# Patient Record
Sex: FEMALE | Race: WHITE | NOT HISPANIC OR LATINO | Employment: FULL TIME | URBAN - METROPOLITAN AREA
[De-identification: names, ages, dates, MRNs, and addresses within clinical notes are randomized per-mention and may not be internally consistent; named-entity substitution may affect disease eponyms.]

---

## 2017-09-26 ENCOUNTER — OFFICE VISIT (OUTPATIENT)
Dept: FAMILY MEDICINE | Facility: CLINIC | Age: 32
End: 2017-09-26
Payer: COMMERCIAL

## 2017-09-26 VITALS
HEIGHT: 64 IN | BODY MASS INDEX: 36.77 KG/M2 | DIASTOLIC BLOOD PRESSURE: 80 MMHG | TEMPERATURE: 99 F | HEART RATE: 71 BPM | WEIGHT: 215.38 LBS | SYSTOLIC BLOOD PRESSURE: 120 MMHG | RESPIRATION RATE: 16 BRPM | OXYGEN SATURATION: 98 %

## 2017-09-26 DIAGNOSIS — J06.9 UPPER RESPIRATORY TRACT INFECTION, UNSPECIFIED TYPE: Primary | ICD-10-CM

## 2017-09-26 PROCEDURE — 3008F BODY MASS INDEX DOCD: CPT | Mod: S$GLB,,, | Performed by: PHYSICIAN ASSISTANT

## 2017-09-26 PROCEDURE — 99999 PR PBB SHADOW E&M-EST. PATIENT-LVL III: CPT | Mod: PBBFAC,,, | Performed by: PHYSICIAN ASSISTANT

## 2017-09-26 PROCEDURE — 99213 OFFICE O/P EST LOW 20 MIN: CPT | Mod: S$GLB,,, | Performed by: PHYSICIAN ASSISTANT

## 2017-09-26 RX ORDER — PROMETHAZINE HYDROCHLORIDE AND DEXTROMETHORPHAN HYDROBROMIDE 6.25; 15 MG/5ML; MG/5ML
5 SYRUP ORAL 4 TIMES DAILY PRN
Qty: 120 ML | Refills: 0 | Status: SHIPPED | OUTPATIENT
Start: 2017-09-26 | End: 2017-10-03

## 2017-09-26 RX ORDER — BENZONATATE 100 MG/1
100 CAPSULE ORAL 3 TIMES DAILY PRN
Qty: 30 CAPSULE | Refills: 0 | Status: SHIPPED | OUTPATIENT
Start: 2017-09-26 | End: 2017-10-26

## 2017-09-26 RX ORDER — LEVOCETIRIZINE DIHYDROCHLORIDE 5 MG/1
5 TABLET, FILM COATED ORAL NIGHTLY
Qty: 30 TABLET | Refills: 11 | Status: SHIPPED | OUTPATIENT
Start: 2017-09-26 | End: 2021-02-17

## 2017-09-26 RX ORDER — ALBUTEROL SULFATE 90 UG/1
2 AEROSOL, METERED RESPIRATORY (INHALATION) EVERY 6 HOURS PRN
Qty: 1 EACH | Refills: 11 | Status: SHIPPED | OUTPATIENT
Start: 2017-09-26

## 2017-09-26 NOTE — LETTER
September 26, 2017         [facility]  Family Medicine  [address]  [address]  [place] [address]  Phone: [phone]  Fax: [phone]   September 26, 2017     Patient: Maggie Obregon   YOB: 1985   Date of Visit: 9/26/2017       To Whom it May Concern:    Maggie Obregon was seen in my clinic on 9/26/2017. She may return to work on when is without fever for 24 hours without any medications.    If you have any questions or concerns, please don't hesitate to call.    Sincerely,         MOSHE Meyrs

## 2017-09-26 NOTE — PROGRESS NOTES
Subjective:       Patient ID: Maggie Obregon is a 32 y.o. female with multiple medical diagnoses as listed in the medical history and problem list that presents for URI and Fever (102.5 last night)  .    Chief Complaint: URI and Fever (102.5 last night)      URI    This is a new problem. The current episode started yesterday. The maximum temperature recorded prior to her arrival was 102 - 102.9 F. Associated symptoms include congestion, coughing (dry and productive ), ear pain, headaches, a sore throat and vomiting (with cough ). Pertinent negatives include no abdominal pain, diarrhea, nausea, rhinorrhea, sinus pain, sneezing or wheezing. Treatments tried: mucinex, tylenol cold and sinus and emergency  The treatment provided mild relief.   Fever    This is a new problem. The current episode started yesterday. The maximum temperature noted was 102 to 102.9 F. Associated symptoms include congestion, coughing (dry and productive ), ear pain, headaches, a sore throat and vomiting (with cough ). Pertinent negatives include no abdominal pain, diarrhea, nausea or wheezing.     Review of Systems   Constitutional: Positive for chills, fatigue and fever.   HENT: Positive for congestion, ear pain, postnasal drip, sinus pressure, sore throat and trouble swallowing. Negative for ear discharge, rhinorrhea, sinus pain and sneezing.    Eyes: Negative for pain, discharge, redness and itching.   Respiratory: Positive for cough (dry and productive ). Negative for chest tightness, shortness of breath and wheezing.         Smoker    Gastrointestinal: Positive for vomiting (with cough ). Negative for abdominal pain, diarrhea and nausea.   Musculoskeletal: Positive for myalgias.   Neurological: Positive for headaches.         PAST MEDICAL HISTORY:  History reviewed. No pertinent past medical history.    SOCIAL HISTORY:  Social History     Social History    Marital status:      Spouse name: N/A    Number of children: N/A    Years of  "education: N/A     Occupational History    Not on file.     Social History Main Topics    Smoking status: Current Every Day Smoker     Packs/day: 1.00     Types: Cigarettes    Smokeless tobacco: Never Used    Alcohol use No    Drug use: No    Sexual activity: Yes     Partners: Male     Other Topics Concern    Not on file     Social History Narrative    No narrative on file       ALLERGIES AND MEDICATIONS: updated and reviewed.  Review of patient's allergies indicates:  No Known Allergies  Current Outpatient Prescriptions   Medication Sig Dispense Refill    albuterol 90 mcg/actuation inhaler Inhale 2 puffs into the lungs every 6 (six) hours as needed for Wheezing. 1 each 11    benzonatate (TESSALON) 100 MG capsule Take 1 capsule (100 mg total) by mouth 3 (three) times daily as needed for Cough. 30 capsule 0    levocetirizine (XYZAL) 5 MG tablet Take 1 tablet (5 mg total) by mouth every evening. 30 tablet 11    promethazine-dextromethorphan (PROMETHAZINE-DM) 6.25-15 mg/5 mL Syrp Take 5 mLs by mouth 4 (four) times daily as needed. 120 mL 0     Current Facility-Administered Medications   Medication Dose Route Frequency Provider Last Rate Last Dose    levonorgestrel 20 mcg/24 hr (5 years) IUD 1 each  1 each Intrauterine 1 time in Clinic/HOD Tk Choudhury MD             Objective:   /80   Pulse 71   Temp 98.9 °F (37.2 °C) (Oral)   Resp 16   Ht 5' 4" (1.626 m)   Wt 97.7 kg (215 lb 6.2 oz)   LMP 09/19/2017 (Exact Date)   SpO2 98%   BMI 36.97 kg/m²      Physical Exam   Constitutional: She is oriented to person, place, and time. No distress.   HENT:   Head: Normocephalic and atraumatic.   Right Ear: External ear and ear canal normal. Tympanic membrane is injected.   Left Ear: External ear and ear canal normal. Tympanic membrane is injected.   Nose: Nose normal. Right sinus exhibits no maxillary sinus tenderness and no frontal sinus tenderness. Left sinus exhibits no maxillary sinus tenderness and " no frontal sinus tenderness.   Mouth/Throat: Uvula is midline and mucous membranes are normal. No oropharyngeal exudate or posterior oropharyngeal erythema.   Air fluid levels   PND   Eyes: Conjunctivae and EOM are normal.   Cardiovascular: Normal rate and regular rhythm.    Pulmonary/Chest: Effort normal and breath sounds normal. She has no decreased breath sounds. She has no wheezes. She has no rhonchi. She has no rales.   Negative egophony  Normal tactile fremitus  No dullness on percussion   Lymphadenopathy:     She has no cervical adenopathy.   Neurological: She is oriented to person, place, and time.   Skin: Skin is warm. No erythema.           Assessment:       1. Upper respiratory tract infection, unspecified type        Plan:       Upper respiratory tract infection, unspecified type  -     levocetirizine (XYZAL) 5 MG tablet; Take 1 tablet (5 mg total) by mouth every evening.  Dispense: 30 tablet; Refill: 11  -     albuterol 90 mcg/actuation inhaler; Inhale 2 puffs into the lungs every 6 (six) hours as needed for Wheezing.  Dispense: 1 each; Refill: 11  -     promethazine-dextromethorphan (PROMETHAZINE-DM) 6.25-15 mg/5 mL Syrp; Take 5 mLs by mouth 4 (four) times daily as needed.  Dispense: 120 mL; Refill: 0  -     benzonatate (TESSALON) 100 MG capsule; Take 1 capsule (100 mg total) by mouth 3 (three) times daily as needed for Cough.  Dispense: 30 capsule; Refill: 0    call if better than worse or no improvement at all in a week.  Increase fluids and rest.   Ibuprofen for aches.         No Follow-up on file.

## 2018-04-04 ENCOUNTER — OFFICE VISIT (OUTPATIENT)
Dept: FAMILY MEDICINE | Facility: CLINIC | Age: 33
End: 2018-04-04
Payer: COMMERCIAL

## 2018-04-04 VITALS
HEIGHT: 64 IN | TEMPERATURE: 98 F | RESPIRATION RATE: 16 BRPM | OXYGEN SATURATION: 98 % | HEART RATE: 72 BPM | WEIGHT: 211.63 LBS | SYSTOLIC BLOOD PRESSURE: 110 MMHG | DIASTOLIC BLOOD PRESSURE: 80 MMHG | BODY MASS INDEX: 36.13 KG/M2

## 2018-04-04 DIAGNOSIS — J30.9 ACUTE ALLERGIC RHINITIS, UNSPECIFIED SEASONALITY, UNSPECIFIED TRIGGER: ICD-10-CM

## 2018-04-04 DIAGNOSIS — R31.9 URINARY TRACT INFECTION WITH HEMATURIA, SITE UNSPECIFIED: Primary | ICD-10-CM

## 2018-04-04 DIAGNOSIS — Z23 NEED FOR 23-POLYVALENT PNEUMOCOCCAL POLYSACCHARIDE VACCINE: ICD-10-CM

## 2018-04-04 DIAGNOSIS — N39.0 URINARY TRACT INFECTION WITH HEMATURIA, SITE UNSPECIFIED: Primary | ICD-10-CM

## 2018-04-04 LAB
BILIRUB SERPL-MCNC: NEGATIVE MG/DL
BLOOD URINE, POC: 50
COLOR, POC UA: YELLOW
GLUCOSE UR QL STRIP: NEGATIVE
KETONES UR QL STRIP: NEGATIVE
LEUKOCYTE ESTERASE URINE, POC: ABNORMAL
NITRITE, POC UA: POSITIVE
PH, POC UA: 5
PROTEIN, POC: ABNORMAL
SPECIFIC GRAVITY, POC UA: 1.02
UROBILINOGEN, POC UA: NEGATIVE

## 2018-04-04 PROCEDURE — 81000 URINALYSIS NONAUTO W/SCOPE: CPT

## 2018-04-04 PROCEDURE — 99999 PR PBB SHADOW E&M-EST. PATIENT-LVL IV: CPT | Mod: PBBFAC,,, | Performed by: PHYSICIAN ASSISTANT

## 2018-04-04 PROCEDURE — 90732 PPSV23 VACC 2 YRS+ SUBQ/IM: CPT | Mod: S$GLB,,, | Performed by: FAMILY MEDICINE

## 2018-04-04 PROCEDURE — 81001 URINALYSIS AUTO W/SCOPE: CPT | Mod: S$GLB,,, | Performed by: PHYSICIAN ASSISTANT

## 2018-04-04 PROCEDURE — 90471 IMMUNIZATION ADMIN: CPT | Mod: S$GLB,,, | Performed by: FAMILY MEDICINE

## 2018-04-04 PROCEDURE — 99214 OFFICE O/P EST MOD 30 MIN: CPT | Mod: 25,S$GLB,, | Performed by: PHYSICIAN ASSISTANT

## 2018-04-04 PROCEDURE — 87086 URINE CULTURE/COLONY COUNT: CPT

## 2018-04-04 RX ORDER — AZELASTINE 1 MG/ML
1 SPRAY, METERED NASAL 2 TIMES DAILY
Qty: 30 ML | Refills: 11 | Status: SHIPPED | OUTPATIENT
Start: 2018-04-04 | End: 2021-02-12

## 2018-04-04 RX ORDER — CEPHALEXIN 500 MG/1
500 CAPSULE ORAL 2 TIMES DAILY
Qty: 6 CAPSULE | Refills: 0 | Status: SHIPPED | OUTPATIENT
Start: 2018-04-04 | End: 2018-04-07

## 2018-04-04 NOTE — PROGRESS NOTES
Administered Pneumococcal 23 vaccine IM to right deltoid.  Patient tolerated injection well, no adverse reactions noted.

## 2018-04-04 NOTE — PROGRESS NOTES
Subjective:       Patient ID: Maggie Obregon is a 32 y.o. female with multiple medical diagnoses as listed in the medical history and problem list that presents for URI (3 days) and Urinary Tract Infection (1 week)  .    Chief Complaint: URI (3 days) and Urinary Tract Infection (1 week)      URI    This is a new problem. The current episode started in the past 7 days (3 days ). The problem has been gradually worsening. Associated symptoms include congestion, coughing (dry; worse at night lying down ) and a sore throat (scratchy ). Pertinent negatives include no abdominal pain, diarrhea, ear pain, headaches, nausea, rhinorrhea, sinus pain, sneezing, vomiting (once ) or wheezing. Treatments tried: promethazine DM; 4 days    Urinary Tract Infection    This is a new problem. The current episode started in the past 7 days. The problem occurs every urination. The problem has been gradually worsening. The quality of the pain is described as burning (started yesterday ). There has been no fever. She is sexually active. There is a history of pyelonephritis. Associated symptoms include frequency and urgency. Pertinent negatives include no chills, discharge, flank pain, hematuria, hesitancy, nausea, vomiting (once ) or bubble bath use. She has tried nothing for the symptoms. Her past medical history is significant for recurrent UTIs.     Review of Systems   Constitutional: Negative for chills, fatigue and fever.   HENT: Positive for congestion, postnasal drip, sore throat (scratchy ) and voice change. Negative for ear pain, rhinorrhea, sinus pain, sinus pressure and sneezing.    Eyes: Negative for pain, discharge, redness and itching.   Respiratory: Positive for cough (dry; worse at night lying down ). Negative for chest tightness, shortness of breath and wheezing.    Gastrointestinal: Negative for abdominal pain, diarrhea, nausea and vomiting (once ).   Genitourinary: Positive for frequency and urgency. Negative for flank pain,  "hematuria and hesitancy.   Neurological: Negative for headaches.         PAST MEDICAL HISTORY:  History reviewed. No pertinent past medical history.    SOCIAL HISTORY:  Social History     Social History    Marital status:      Spouse name: N/A    Number of children: N/A    Years of education: N/A     Occupational History    Not on file.     Social History Main Topics    Smoking status: Current Every Day Smoker     Packs/day: 1.00     Types: Cigarettes    Smokeless tobacco: Never Used    Alcohol use No    Drug use: No    Sexual activity: Yes     Partners: Male     Other Topics Concern    Not on file     Social History Narrative    No narrative on file       ALLERGIES AND MEDICATIONS: updated and reviewed.  Review of patient's allergies indicates:  No Known Allergies  Current Outpatient Prescriptions   Medication Sig Dispense Refill    albuterol 90 mcg/actuation inhaler Inhale 2 puffs into the lungs every 6 (six) hours as needed for Wheezing. 1 each 11    levocetirizine (XYZAL) 5 MG tablet Take 1 tablet (5 mg total) by mouth every evening. 30 tablet 11    azelastine (ASTELIN) 137 mcg (0.1 %) nasal spray 1 spray (137 mcg total) by Nasal route 2 (two) times daily. 30 mL 11    cephALEXin (KEFLEX) 500 MG capsule Take 1 capsule (500 mg total) by mouth 2 (two) times daily. 6 capsule 0     Current Facility-Administered Medications   Medication Dose Route Frequency Provider Last Rate Last Dose    levonorgestrel 20 mcg/24 hr (5 years) IUD 1 each  1 each Intrauterine 1 time in Clinic/HOD Tk Choudhury MD             Objective:   /80   Pulse 72   Temp 98.3 °F (36.8 °C) (Oral)   Resp 16   Ht 5' 4" (1.626 m)   Wt 96 kg (211 lb 10.3 oz)   LMP 03/28/2018   SpO2 98%   BMI 36.33 kg/m²      Physical Exam   Constitutional: No distress.   HENT:   Head: Normocephalic and atraumatic.   Right Ear: External ear and ear canal normal. Tympanic membrane is not injected. No middle ear effusion.   Left Ear: " External ear and ear canal normal. Tympanic membrane is not injected.  No middle ear effusion.   Nose: No mucosal edema or rhinorrhea. Right sinus exhibits no maxillary sinus tenderness and no frontal sinus tenderness. Left sinus exhibits no maxillary sinus tenderness and no frontal sinus tenderness.   Mouth/Throat: Uvula is midline and mucous membranes are normal. No oropharyngeal exudate or posterior oropharyngeal erythema.   Air fluid levels   PND   Eyes: Conjunctivae and EOM are normal.   Cardiovascular: Normal rate and regular rhythm.    Pulmonary/Chest: Effort normal and breath sounds normal.   Abdominal: Bowel sounds are normal. There is no tenderness.   Lymphadenopathy:     She has no cervical adenopathy.   Skin: Skin is warm.           Assessment:       1. Urinary tract infection with hematuria, site unspecified    2. Acute allergic rhinitis, unspecified seasonality, unspecified trigger    3. Need for 23-polyvalent pneumococcal polysaccharide vaccine        Plan:       Urinary tract infection with hematuria, site unspecified  -     cephALEXin (KEFLEX) 500 MG capsule; Take 1 capsule (500 mg total) by mouth 2 (two) times daily.  Dispense: 6 capsule; Refill: 0  -     POCT urinalysis, dipstick or tablet reag: +leuko, pos nitrate, and 50 blood   -     Urinalysis; Future; Expected date: 04/04/2018  -     Urine culture; Future; Expected date: 04/04/2018    -will contact with results     Acute allergic rhinitis, unspecified seasonality, unspecified trigger  -     azelastine (ASTELIN) 137 mcg (0.1 %) nasal spray; 1 spray (137 mcg total) by Nasal route 2 (two) times daily.  Dispense: 30 mL; Refill: 11  Continue xyzal.    Need for 23-polyvalent pneumococcal polysaccharide vaccine  -     (In Office Administered) Pneumococcal Polysaccharide Vaccine (23 Valent) (SQ/IM)    Will schedule with Dr. Chand for pap.         No Follow-up on file.

## 2018-04-05 LAB
AMORPH CRY URNS QL MICRO: ABNORMAL
BACTERIA #/AREA URNS HPF: ABNORMAL /HPF
BILIRUB UR QL STRIP: NEGATIVE
CLARITY UR: ABNORMAL
COLOR UR: ABNORMAL
GLUCOSE UR QL STRIP: NEGATIVE
HGB UR QL STRIP: ABNORMAL
KETONES UR QL STRIP: NEGATIVE
LEUKOCYTE ESTERASE UR QL STRIP: ABNORMAL
MICROSCOPIC COMMENT: ABNORMAL
NITRITE UR QL STRIP: NEGATIVE
PH UR STRIP: 5 [PH] (ref 5–8)
PROT UR QL STRIP: NEGATIVE
RBC #/AREA URNS HPF: 2 /HPF (ref 0–4)
SP GR UR STRIP: 1.02 (ref 1–1.03)
SQUAMOUS #/AREA URNS HPF: ABNORMAL /HPF
URN SPEC COLLECT METH UR: ABNORMAL
UROBILINOGEN UR STRIP-ACNC: NEGATIVE EU/DL
WBC #/AREA URNS HPF: 10 /HPF (ref 0–5)

## 2018-04-07 LAB — BACTERIA UR CULT: NORMAL

## 2018-04-17 ENCOUNTER — OFFICE VISIT (OUTPATIENT)
Dept: OBSTETRICS AND GYNECOLOGY | Facility: CLINIC | Age: 33
End: 2018-04-17
Payer: COMMERCIAL

## 2018-04-17 VITALS
BODY MASS INDEX: 35.68 KG/M2 | DIASTOLIC BLOOD PRESSURE: 78 MMHG | HEIGHT: 64 IN | WEIGHT: 209 LBS | SYSTOLIC BLOOD PRESSURE: 133 MMHG

## 2018-04-17 DIAGNOSIS — Z30.431 IUD CHECK UP: ICD-10-CM

## 2018-04-17 DIAGNOSIS — N92.1 METRORRHAGIA: ICD-10-CM

## 2018-04-17 DIAGNOSIS — Z01.419 GYNECOLOGIC EXAM NORMAL: Primary | ICD-10-CM

## 2018-04-17 DIAGNOSIS — Z11.3 SCREEN FOR STD (SEXUALLY TRANSMITTED DISEASE): ICD-10-CM

## 2018-04-17 PROCEDURE — 99395 PREV VISIT EST AGE 18-39: CPT | Mod: S$GLB,,, | Performed by: OBSTETRICS & GYNECOLOGY

## 2018-04-17 PROCEDURE — 87491 CHLMYD TRACH DNA AMP PROBE: CPT

## 2018-04-17 PROCEDURE — 88175 CYTOPATH C/V AUTO FLUID REDO: CPT

## 2018-04-17 PROCEDURE — 99999 PR PBB SHADOW E&M-EST. PATIENT-LVL III: CPT | Mod: PBBFAC,,, | Performed by: OBSTETRICS & GYNECOLOGY

## 2018-04-17 RX ORDER — ESTRADIOL 2 MG/1
2 TABLET ORAL DAILY
Qty: 21 TABLET | Refills: 0 | Status: SHIPPED | OUTPATIENT
Start: 2018-04-17 | End: 2019-12-06

## 2018-04-17 NOTE — PROGRESS NOTES
Subjective:       Patient ID: Maggie Obergon is a 33 y.o. female.    Chief Complaint:  Gynecologic Exam      History of Present Illness  HPI  Annual Exam-Premenopausal  Patient presents for annual exam. The patient has no complaints today. The patient is sexually active. GYN screening history: last pap: approximate date approx 3 yrs ago per pt hx and was normal. The patient wears seatbelts: yes. The patient participates in regular exercise: no. Has the patient ever been transfused or tattooed?: yes. The patient reports that there is not domestic violence in her life.    Pt has Mirena IUD placed 2015 for contraception and pt is happy with device.  Pt usually has monthly menses in spite of device, but pt has been bleeding for 19 days.  There is no pain.                  GYN & OB History  Patient's last menstrual period was 2018.   Date of Last Pap: No result found    OB History    Para Term  AB Living   4         4   SAB TAB Ectopic Multiple Live Births                  # Outcome Date GA Lbr Pastor/2nd Weight Sex Delivery Anes PTL Lv   4             3             2             1                    Review of Systems  Review of Systems   Constitutional: Negative.    Respiratory: Negative.    Cardiovascular: Negative.    Gastrointestinal: Negative.    Endocrine: Negative.    Genitourinary: Negative.    Musculoskeletal: Negative.    Hematological: Negative.    Psychiatric/Behavioral: Negative.    Breast: negative.            Objective:    Physical Exam:   Constitutional: She is oriented to person, place, and time. She appears well-developed and well-nourished. No distress.    HENT:   Head: Normocephalic and atraumatic.     Neck: Normal range of motion.    Cardiovascular: Normal rate.     Pulmonary/Chest: Effort normal. No respiratory distress. Right breast exhibits no inverted nipple, no mass, no nipple discharge, no skin change, no tenderness, presence, no bleeding and no  swelling. Left breast exhibits no inverted nipple, no mass, no nipple discharge, no skin change, no tenderness, presence, no bleeding and no swelling.        Abdominal: Soft. She exhibits no distension and no mass. There is no tenderness. There is no rebound and no guarding.     Genitourinary: Vagina normal and uterus normal. There is no rash, tenderness, lesion or injury on the right labia. There is no rash, tenderness, lesion or injury on the left labia. Right adnexum displays no mass, no tenderness and no fullness. Left adnexum displays no mass, no tenderness and no fullness. No erythema, tenderness, bleeding, rectocele, cystocele or unspecified prolapse of vaginal walls in the vagina. No foreign body in the vagina. No signs of injury around the vagina. No vaginal discharge found. Labial bartholins normal.Cervix exhibits no motion tenderness, no discharge, no friability and presence. Additional cervical findings: IUD strings visualizedAlso,  no recent pap smear            Musculoskeletal: Normal range of motion and moves all extremeties. She exhibits no tenderness.       Neurological: She is alert and oriented to person, place, and time. No cranial nerve deficit. Coordination normal.    Skin: She is not diaphoretic.    Psychiatric: She has a normal mood and affect. Her behavior is normal. Judgment and thought content normal.          Assessment:        1. Gynecologic exam normal    2. Screen for STD (sexually transmitted disease)    3. Metrorrhagia    4. IUD check up               Plan:      1.  Rx estradiol 2 mg x 21 d for bleeding with IUD  2.  Pap, GC/CT collected

## 2018-04-18 LAB
C TRACH DNA SPEC QL NAA+PROBE: NOT DETECTED
N GONORRHOEA DNA SPEC QL NAA+PROBE: NOT DETECTED

## 2019-01-07 ENCOUNTER — OFFICE VISIT (OUTPATIENT)
Dept: FAMILY MEDICINE | Facility: CLINIC | Age: 34
End: 2019-01-07
Payer: COMMERCIAL

## 2019-01-07 VITALS
RESPIRATION RATE: 18 BRPM | BODY MASS INDEX: 39.78 KG/M2 | OXYGEN SATURATION: 97 % | TEMPERATURE: 98 F | DIASTOLIC BLOOD PRESSURE: 80 MMHG | WEIGHT: 233 LBS | HEART RATE: 65 BPM | HEIGHT: 64 IN | SYSTOLIC BLOOD PRESSURE: 118 MMHG

## 2019-01-07 DIAGNOSIS — E66.01 SEVERE OBESITY (BMI >= 40): Primary | ICD-10-CM

## 2019-01-07 DIAGNOSIS — Z00.00 ANNUAL PHYSICAL EXAM: ICD-10-CM

## 2019-01-07 PROCEDURE — 99999 PR PBB SHADOW E&M-EST. PATIENT-LVL III: CPT | Mod: PBBFAC,,, | Performed by: PHYSICIAN ASSISTANT

## 2019-01-07 PROCEDURE — 99395 PR PREVENTIVE VISIT,EST,18-39: ICD-10-PCS | Mod: S$GLB,,, | Performed by: PHYSICIAN ASSISTANT

## 2019-01-07 PROCEDURE — 99395 PREV VISIT EST AGE 18-39: CPT | Mod: S$GLB,,, | Performed by: PHYSICIAN ASSISTANT

## 2019-01-07 PROCEDURE — 99999 PR PBB SHADOW E&M-EST. PATIENT-LVL III: ICD-10-PCS | Mod: PBBFAC,,, | Performed by: PHYSICIAN ASSISTANT

## 2019-01-07 NOTE — PROGRESS NOTES
Subjective:       Patient ID: Maggie Obregon is a 33 y.o. female with multiple medical diagnoses as listed in the medical history and problem list that presents for Annual Exam and Sinus Problem  .    Chief Complaint: Annual Exam and Sinus Problem      HPI   Concerns: weight gain, interested in weight loss medications and a cough  Diet: eat at home; fried foods, red meats, carbs   Exercise: just what she does at work:  at [employer]   Eye exam: 18  Dentist: twice a year      LMP: 18; regular-it has gone longer before for which Dr. Choudhury gave her estrace but she has not had to take that  Last pap: 2018      Review of Systems   Constitutional: Negative for activity change, appetite change, chills, fatigue and fever.   HENT: Negative for congestion, rhinorrhea, sinus pressure and sinus pain.    Eyes: Negative for photophobia, pain, discharge and itching.   Respiratory: Positive for cough, chest tightness and shortness of breath. Negative for wheezing.         No history of asthma  Smoker 1 pack per day   Interested in quitting    Cardiovascular: Negative for chest pain, palpitations and leg swelling.   Gastrointestinal: Negative for abdominal pain, constipation, diarrhea, nausea and rectal pain.   Endocrine: Positive for polydipsia. Negative for cold intolerance, heat intolerance and polyuria.   Genitourinary: Negative for dysuria and frequency.   Musculoskeletal: Negative for arthralgias, joint swelling, myalgias, neck pain and neck stiffness.   Skin: Negative for rash.   Neurological: Positive for headaches (temple ). Negative for dizziness and light-headedness.   Psychiatric/Behavioral: Negative for sleep disturbance.         PAST MEDICAL HISTORY:  No past medical history on file.    SOCIAL HISTORY:  Social History     Socioeconomic History    Marital status:      Spouse name: Not on file    Number of children: Not on file    Years of education: Not on file    Highest education  "level: Not on file   Social Needs    Financial resource strain: Not on file    Food insecurity - worry: Not on file    Food insecurity - inability: Not on file    Transportation needs - medical: Not on file    Transportation needs - non-medical: Not on file   Occupational History    Not on file   Tobacco Use    Smoking status: Current Every Day Smoker     Packs/day: 1.00     Types: Cigarettes    Smokeless tobacco: Never Used   Substance and Sexual Activity    Alcohol use: No     Alcohol/week: 0.0 oz    Drug use: No    Sexual activity: Yes     Partners: Male     Birth control/protection: IUD     Comment: mirena   Other Topics Concern    Not on file   Social History Narrative    Not on file       ALLERGIES AND MEDICATIONS: updated and reviewed.  Review of patient's allergies indicates:  No Known Allergies  Current Outpatient Medications   Medication Sig Dispense Refill    azelastine (ASTELIN) 137 mcg (0.1 %) nasal spray 1 spray (137 mcg total) by Nasal route 2 (two) times daily. 30 mL 11    albuterol 90 mcg/actuation inhaler Inhale 2 puffs into the lungs every 6 (six) hours as needed for Wheezing. 1 each 11    estradiol (ESTRACE) 2 MG tablet Take 1 tablet (2 mg total) by mouth once daily. 21 tablet 0    levocetirizine (XYZAL) 5 MG tablet Take 1 tablet (5 mg total) by mouth every evening. 30 tablet 11     Current Facility-Administered Medications   Medication Dose Route Frequency Provider Last Rate Last Dose    levonorgestrel 20 mcg/24 hr (5 years) IUD 1 each  1 each Intrauterine 1 time in Clinic/HOD Tk Choudhury MD             Objective:   /80 (BP Location: Right arm, Patient Position: Sitting, BP Method: Large (Manual))   Pulse 65   Temp 98.3 °F (36.8 °C) (Oral)   Resp 18   Ht 5' 4" (1.626 m)   Wt 105.7 kg (233 lb 0.4 oz)   SpO2 97%   BMI 40.00 kg/m²      Physical Exam   Constitutional: She is oriented to person, place, and time. She appears well-developed. No distress.   HENT: "   Head: Normocephalic.   Right Ear: External ear and ear canal normal.   Left Ear: External ear and ear canal normal.   Nose: Rhinorrhea present. No mucosal edema. Right sinus exhibits no maxillary sinus tenderness and no frontal sinus tenderness. Left sinus exhibits no maxillary sinus tenderness.   Mouth/Throat: Uvula is midline and mucous membranes are normal. No oropharyngeal exudate or posterior oropharyngeal erythema.   Air fluid levels   PND   Eyes: Conjunctivae and EOM are normal.   Cardiovascular: Normal rate and regular rhythm.   Pulmonary/Chest: Effort normal and breath sounds normal.   Abdominal: Soft. Bowel sounds are normal.   Musculoskeletal: Normal range of motion.   Lymphadenopathy:     She has no cervical adenopathy.   Neurological: She is alert and oriented to person, place, and time.   Skin: Skin is warm. No erythema.   Psychiatric: She has a normal mood and affect. Her behavior is normal.           Assessment:       1. Severe obesity (BMI >= 40)    2. Annual physical exam        Plan:       Severe obesity (BMI >= 40)  -     Hemoglobin A1c; Future; Expected date: 01/07/2019  -     Lipid panel; Future; Expected date: 01/07/2019  Counseled on diet to cut back on red meats and carbs. Baking and grilling foods. Add more vegetables and cutting down carbs.   Name given for Dr. Causey and [facility] if labs all normal for weight loss.     Annual physical exam  -     Comprehensive metabolic panel; Future; Expected date: 01/07/2019  -     Hemoglobin A1c; Future; Expected date: 01/07/2019  -     Lipid panel; Future; Expected date: 01/07/2019  -     TSH; Future; Expected date: 01/08/2019  -     T4, free; Future; Expected date: 01/07/2019  -     CBC auto differential; Future; Expected date: 01/07/2019  -     Urinalysis; Future; Expected date: 01/07/2019    -will contact with results     Has xyzal at home for post nasal drip         No Follow-up on file.

## 2019-01-08 ENCOUNTER — LAB VISIT (OUTPATIENT)
Dept: LAB | Facility: HOSPITAL | Age: 34
End: 2019-01-08
Attending: FAMILY MEDICINE
Payer: COMMERCIAL

## 2019-01-08 DIAGNOSIS — E66.01 SEVERE OBESITY (BMI >= 40): ICD-10-CM

## 2019-01-08 DIAGNOSIS — Z00.00 ANNUAL PHYSICAL EXAM: ICD-10-CM

## 2019-01-08 LAB
ALBUMIN SERPL BCP-MCNC: 3.9 G/DL
ALP SERPL-CCNC: 75 U/L
ALT SERPL W/O P-5'-P-CCNC: 21 U/L
ANION GAP SERPL CALC-SCNC: 9 MMOL/L
AST SERPL-CCNC: 20 U/L
BASOPHILS # BLD AUTO: 0.02 K/UL
BASOPHILS NFR BLD: 0.2 %
BILIRUB SERPL-MCNC: 0.5 MG/DL
BUN SERPL-MCNC: 12 MG/DL
CALCIUM SERPL-MCNC: 9 MG/DL
CHLORIDE SERPL-SCNC: 106 MMOL/L
CHOLEST SERPL-MCNC: 201 MG/DL
CHOLEST/HDLC SERPL: 5.9 {RATIO}
CO2 SERPL-SCNC: 24 MMOL/L
CREAT SERPL-MCNC: 0.8 MG/DL
DIFFERENTIAL METHOD: NORMAL
EOSINOPHIL # BLD AUTO: 0.2 K/UL
EOSINOPHIL NFR BLD: 1.6 %
ERYTHROCYTE [DISTWIDTH] IN BLOOD BY AUTOMATED COUNT: 13 %
EST. GFR  (AFRICAN AMERICAN): >60 ML/MIN/1.73 M^2
EST. GFR  (NON AFRICAN AMERICAN): >60 ML/MIN/1.73 M^2
ESTIMATED AVG GLUCOSE: 105 MG/DL
GLUCOSE SERPL-MCNC: 99 MG/DL
HBA1C MFR BLD HPLC: 5.3 %
HCT VFR BLD AUTO: 39.8 %
HDLC SERPL-MCNC: 34 MG/DL
HDLC SERPL: 16.9 %
HGB BLD-MCNC: 13.6 G/DL
LDLC SERPL CALC-MCNC: 133.4 MG/DL
LYMPHOCYTES # BLD AUTO: 3.6 K/UL
LYMPHOCYTES NFR BLD: 32.8 %
MCH RBC QN AUTO: 30 PG
MCHC RBC AUTO-ENTMCNC: 34.2 G/DL
MCV RBC AUTO: 88 FL
MONOCYTES # BLD AUTO: 0.8 K/UL
MONOCYTES NFR BLD: 7.2 %
NEUTROPHILS # BLD AUTO: 6.4 K/UL
NEUTROPHILS NFR BLD: 58.2 %
NONHDLC SERPL-MCNC: 167 MG/DL
PLATELET # BLD AUTO: 248 K/UL
PMV BLD AUTO: 9.6 FL
POTASSIUM SERPL-SCNC: 4.1 MMOL/L
PROT SERPL-MCNC: 6.9 G/DL
RBC # BLD AUTO: 4.54 M/UL
SODIUM SERPL-SCNC: 139 MMOL/L
T4 FREE SERPL-MCNC: 0.95 NG/DL
TRIGL SERPL-MCNC: 168 MG/DL
TSH SERPL DL<=0.005 MIU/L-ACNC: 1.46 UIU/ML
WBC # BLD AUTO: 11 K/UL

## 2019-01-08 PROCEDURE — 84439 ASSAY OF FREE THYROXINE: CPT

## 2019-01-08 PROCEDURE — 36415 COLL VENOUS BLD VENIPUNCTURE: CPT | Mod: PO

## 2019-01-08 PROCEDURE — 84443 ASSAY THYROID STIM HORMONE: CPT

## 2019-01-08 PROCEDURE — 80053 COMPREHEN METABOLIC PANEL: CPT

## 2019-01-08 PROCEDURE — 80061 LIPID PANEL: CPT

## 2019-01-08 PROCEDURE — 83036 HEMOGLOBIN GLYCOSYLATED A1C: CPT

## 2019-01-08 PROCEDURE — 85025 COMPLETE CBC W/AUTO DIFF WBC: CPT

## 2019-10-14 ENCOUNTER — HOSPITAL ENCOUNTER (EMERGENCY)
Facility: HOSPITAL | Age: 34
Discharge: HOME | End: 2019-10-14
Attending: EMERGENCY MEDICINE
Payer: COMMERCIAL

## 2019-10-14 VITALS
BODY MASS INDEX: 39.27 KG/M2 | TEMPERATURE: 99 F | WEIGHT: 230 LBS | SYSTOLIC BLOOD PRESSURE: 110 MMHG | DIASTOLIC BLOOD PRESSURE: 69 MMHG | HEART RATE: 78 BPM | OXYGEN SATURATION: 97 % | HEIGHT: 64 IN | RESPIRATION RATE: 18 BRPM

## 2019-10-14 DIAGNOSIS — M25.561 RIGHT MEDIAL KNEE PAIN: Primary | ICD-10-CM

## 2019-10-14 LAB
B-HCG UR QL: NEGATIVE
CTP QC/QA: YES

## 2019-10-14 PROCEDURE — 25000003 PHARM REV CODE 250: Performed by: PHYSICIAN ASSISTANT

## 2019-10-14 PROCEDURE — 99284 EMERGENCY DEPT VISIT MOD MDM: CPT | Mod: 25

## 2019-10-14 PROCEDURE — 81025 URINE PREGNANCY TEST: CPT | Performed by: PHYSICIAN ASSISTANT

## 2019-10-14 PROCEDURE — 29505 APPLICATION LONG LEG SPLINT: CPT | Mod: RT

## 2019-10-14 RX ORDER — HYDROCODONE BITARTRATE AND ACETAMINOPHEN 5; 325 MG/1; MG/1
1 TABLET ORAL EVERY 6 HOURS PRN
Qty: 12 TABLET | Refills: 0 | Status: SHIPPED | OUTPATIENT
Start: 2019-10-14 | End: 2019-10-17

## 2019-10-14 RX ORDER — MELOXICAM 7.5 MG/1
7.5 TABLET ORAL DAILY
Qty: 12 TABLET | Refills: 0 | Status: SHIPPED | OUTPATIENT
Start: 2019-10-14 | End: 2021-02-12

## 2019-10-14 RX ORDER — HYDROCODONE BITARTRATE AND ACETAMINOPHEN 5; 325 MG/1; MG/1
1 TABLET ORAL
Status: COMPLETED | OUTPATIENT
Start: 2019-10-14 | End: 2019-10-14

## 2019-10-14 RX ADMIN — HYDROCODONE BITARTRATE AND ACETAMINOPHEN 1 TABLET: 5; 325 TABLET ORAL at 11:10

## 2019-10-14 NOTE — ED PROVIDER NOTES
Encounter Date: 10/14/2019    SCRIBE #1 NOTE: I, Sandy Larry, am scribing for, and in the presence of,  Miguel Javier PA-C. I have scribed the following portions of the note - Other sections scribed: HPI, ROS.       History     Chief Complaint   Patient presents with    Knee Pain     pt c/o (R) knee pain X 2 days worsening today. pt denies injury.     This is a 34 y.o. female who presents to the ED complaining of acute, moderate, gradual, right knee pain since 2 days ago. Patient states symptoms are worsening today. She denies hip pain, fever, or numbness. She reports taking ibuprofen at 0730 with no relief. Patient is on IUD (Mirena) currently. She adds normal hematoma to right leg.  Patient notes no other associated symptoms. Patient states nothing exacerbates symptoms. She adds nothing alleviates symptoms.    The history is provided by the patient. No  was used.     Review of patient's allergies indicates:  No Known Allergies  History reviewed. No pertinent past medical history.  History reviewed. No pertinent surgical history.  Family History   Problem Relation Age of Onset    Cancer Maternal Aunt 48        breast cancer    Cancer Paternal Aunt 42        breast cancer     Social History     Tobacco Use    Smoking status: Current Every Day Smoker     Packs/day: 1.00     Types: Cigarettes    Smokeless tobacco: Never Used   Substance Use Topics    Alcohol use: No     Alcohol/week: 0.0 standard drinks    Drug use: No     Review of Systems   Constitutional: Negative for chills and fever.   HENT: Negative for congestion and sore throat.    Eyes: Negative for visual disturbance.   Respiratory: Negative for cough and shortness of breath.    Cardiovascular: Negative for chest pain.   Gastrointestinal: Negative for abdominal pain, nausea and vomiting.   Endocrine: Negative for polyuria.   Musculoskeletal: Negative for myalgias (Hip pain).        (+) Right knee pain.   Skin: Negative for  rash.   Neurological: Negative for dizziness and numbness.   All other systems reviewed and are negative.      Physical Exam     Initial Vitals [10/14/19 1113]   BP Pulse Resp Temp SpO2   114/74 82 18 97.7 °F (36.5 °C) 99 %      MAP       --         Physical Exam    Nursing note and vitals reviewed.  Constitutional: She appears well-developed and well-nourished. She is not diaphoretic. No distress.   HENT:   Head: Normocephalic and atraumatic.   Nose: Nose normal.   Eyes: Conjunctivae and EOM are normal. Right eye exhibits no discharge. Left eye exhibits no discharge.   Neck: Normal range of motion. No tracheal deviation present. No JVD present.   Cardiovascular: Normal rate, regular rhythm and normal heart sounds. Exam reveals no friction rub.    No murmur heard.  Pulmonary/Chest: Breath sounds normal. No stridor. No respiratory distress. She has no wheezes. She has no rhonchi. She has no rales. She exhibits no tenderness.   Musculoskeletal:   Reproducible tenderness of the right medial knee with associated swelling. No erythema or warmth.  Full ROM of right knee, but with reproduction of pain. No hip, popliteal space, calf, or ankle tenderness. No laxity of joint. Pedal pulses 2+ and equal.  Sensation intact and equal. Fully bears weight on right lower extremity, but with pain. Ambulating, but with limp to the right side.   Neurological: She is alert and oriented to person, place, and time.   Skin: Skin is warm and dry. No rash and no abscess noted. No erythema. No pallor.         ED Course   Splint Application  Date/Time: 10/14/2019 4:17 PM  Performed by: Miguel Javier PA-C  Authorized by: Jamal Last MD   Consent Done: Yes  Location: R knee.  Splint type: knee immobilizer and crutches.  Post-procedure: The splinted body part was neurovascularly unchanged following the procedure.  Patient tolerance: Patient tolerated the procedure well with no immediate complications        Labs Reviewed   POCT URINE  PREGNANCY          Imaging Results          X-Ray Knee 3 View Right (Final result)  Result time 10/14/19 12:03:05    Final result by Deandre Almonte MD (10/14/19 12:03:05)                 Impression:      No evidence for acute fracture, bone destruction, or dislocation.      Electronically signed by: Deandre Almonte MD  Date:    10/14/2019  Time:    12:03             Narrative:    EXAMINATION:  XR KNEE 3 VIEW RIGHT    CLINICAL HISTORY:  Pain in right knee    TECHNIQUE:  AP, lateral, and Merchant views of the right knee were performed.    COMPARISON:  None    FINDINGS:  The bones are intact.  There is no evidence for acute fracture or bone destruction.  There is no evidence for dislocation.  No bony erosions are identified.  Soft tissues are unremarkable.                                 Medical Decision Making:   History:   Old Medical Records: I decided to obtain old medical records.  Independently Interpreted Test(s):   I have ordered and independently interpreted X-rays - see prior notes.  Clinical Tests:   Lab Tests: Ordered and Reviewed  Radiological Study: Ordered and Reviewed    This is an emergent evaluation of a 34 y.o. female presenting to the ED for R sided knee pain. Denies other injury, hip pain, ankle pain, fever, inability to bear weight on knee, and numbness/tingling. Afebrile. Patient is non-toxic appearing and in no acute distress. Ambulatory in ED. Xray shows no acute fracture or dislocation.     Presentation suspicious for possible ligamentous injury. No physical exam findings, Hx, or significant risk factors to suggest DVT. Given the above, I have also considered but doubt vascular injury, septic joint, abscess/cellulitis, ruptured baker's cyst, avascular necrosis, gout, ankle injury, hip injury, and lumbar back injury.    Pain controlled in ED. Knee immobilizer and crutches issued. Discharged home with supportive care. I discussed RICE treatment with the patient and issued the patient an  educational handout on knee injuries. Instructed to follow up with orthopedics for further evaluation and management of symptoms.     I discussed with the patient the diagnosis, treatment plan, indications for return to the emergency department, and for expected follow-up. The patient verbalized an understanding. The patient is asked if there are any questions or concerns. We discuss the case, until all issues are addressed to the patients satisfaction. Patient understands and is agreeable to the plan.          Scribe Attestation:   Scribe #1: I performed the above scribed service and the documentation accurately describes the services I performed. I attest to the accuracy of the note.               Clinical Impression:       ICD-10-CM ICD-9-CM   1. Right medial knee pain M25.561 719.46               Scribe attestation: I, Miguel Javier PA-C, personally performed the services described in this documentation. All medical record entries made by the scribe were at my direction and in my presence.  I have reviewed the chart and agree that the record reflects my personal performance and is accurate and complete.                 Miguel Javier PA-C  10/14/19 1616       Miguel Javier PA-C  10/14/19 1610

## 2019-12-05 ENCOUNTER — PATIENT OUTREACH (OUTPATIENT)
Dept: ADMINISTRATIVE | Facility: OTHER | Age: 34
End: 2019-12-05

## 2019-12-06 ENCOUNTER — OFFICE VISIT (OUTPATIENT)
Dept: OBSTETRICS AND GYNECOLOGY | Facility: CLINIC | Age: 34
End: 2019-12-06
Payer: COMMERCIAL

## 2019-12-06 ENCOUNTER — CLINICAL SUPPORT (OUTPATIENT)
Dept: OBSTETRICS AND GYNECOLOGY | Facility: CLINIC | Age: 34
End: 2019-12-06
Payer: COMMERCIAL

## 2019-12-06 VITALS
SYSTOLIC BLOOD PRESSURE: 120 MMHG | HEIGHT: 64 IN | DIASTOLIC BLOOD PRESSURE: 76 MMHG | BODY MASS INDEX: 36.67 KG/M2 | WEIGHT: 214.81 LBS

## 2019-12-06 VITALS — WEIGHT: 214 LBS | BODY MASS INDEX: 36.73 KG/M2

## 2019-12-06 DIAGNOSIS — Z30.013 ENCOUNTER FOR INITIAL PRESCRIPTION OF INJECTABLE CONTRACEPTIVE: Primary | ICD-10-CM

## 2019-12-06 DIAGNOSIS — Z30.42 ENCOUNTER FOR MANAGEMENT AND INJECTION OF DEPO-PROVERA: Primary | ICD-10-CM

## 2019-12-06 DIAGNOSIS — Z01.419 GYNECOLOGIC EXAM NORMAL: ICD-10-CM

## 2019-12-06 DIAGNOSIS — Z30.432 ENCOUNTER FOR IUD REMOVAL: ICD-10-CM

## 2019-12-06 PROCEDURE — 99395 PREV VISIT EST AGE 18-39: CPT | Mod: 25,S$GLB,, | Performed by: OBSTETRICS & GYNECOLOGY

## 2019-12-06 PROCEDURE — 99999 PR PBB SHADOW E&M-EST. PATIENT-LVL II: CPT | Mod: PBBFAC,,,

## 2019-12-06 PROCEDURE — 99999 PR PBB SHADOW E&M-EST. PATIENT-LVL III: CPT | Mod: PBBFAC,,, | Performed by: OBSTETRICS & GYNECOLOGY

## 2019-12-06 PROCEDURE — 96372 THER/PROPH/DIAG INJ SC/IM: CPT | Mod: 59,S$GLB,, | Performed by: OBSTETRICS & GYNECOLOGY

## 2019-12-06 PROCEDURE — 58301 REMOVE INTRAUTERINE DEVICE: CPT | Mod: S$GLB,,, | Performed by: OBSTETRICS & GYNECOLOGY

## 2019-12-06 PROCEDURE — 99999 PR PBB SHADOW E&M-EST. PATIENT-LVL II: ICD-10-PCS | Mod: PBBFAC,,,

## 2019-12-06 PROCEDURE — 99395 PR PREVENTIVE VISIT,EST,18-39: ICD-10-PCS | Mod: 25,S$GLB,, | Performed by: OBSTETRICS & GYNECOLOGY

## 2019-12-06 PROCEDURE — 99999 PR PBB SHADOW E&M-EST. PATIENT-LVL III: ICD-10-PCS | Mod: PBBFAC,,, | Performed by: OBSTETRICS & GYNECOLOGY

## 2019-12-06 PROCEDURE — 58301 PR REMOVE, INTRAUTERINE DEVICE: ICD-10-PCS | Mod: S$GLB,,, | Performed by: OBSTETRICS & GYNECOLOGY

## 2019-12-06 PROCEDURE — 96372 PR INJECTION,THERAP/PROPH/DIAG2ST, IM OR SUBCUT: ICD-10-PCS | Mod: 59,S$GLB,, | Performed by: OBSTETRICS & GYNECOLOGY

## 2019-12-06 RX ORDER — MEDROXYPROGESTERONE ACETATE 150 MG/ML
150 INJECTION, SUSPENSION INTRAMUSCULAR
Status: COMPLETED | OUTPATIENT
Start: 2019-12-06 | End: 2019-12-06

## 2019-12-06 RX ADMIN — MEDROXYPROGESTERONE ACETATE 150 MG: 150 INJECTION, SUSPENSION INTRAMUSCULAR at 12:12

## 2019-12-06 NOTE — PROGRESS NOTES
PT'S ORIGINAL APPT WAS @  9562   , PT ARRIVED @ 1204     , PT WAS ROOMED @  1209    .    REVIEWED WITH PT DEPO PROVERA,  HAND OUT GIVEN TO PT ABOUT DEPO PROVERA, REVIEWED MEDICATION DOCUMENTATION TO CONFIRM CORRECT MEDICATION, INJECTION GIVEN VIA  L GLUTEAL W/O INCIDENT

## 2019-12-07 NOTE — PROGRESS NOTES
Subjective:       Patient ID: Maggie Obregon is a 34 y.o. female.    Chief Complaint:  Well Woman      History of Present Illness  HPI  Annual Exam-Premenopausal  Patient presents for annual exam. The patient has no complaints today. The patient is sexually active. GYN screening history: last pap: approximate date 2018 and was normal. The patient wears seatbelts: yes. The patient participates in regular exercise: no. Has the patient ever been transfused or tattooed?: yes. The patient reports that there is not domestic violence in her life.    Pt had Mirena IUD which is due for removal 2020; however, pt desires device to be removed today because of abnormal bleeding profile.  Pt was given supplemental estrogen last year to help resolve bleeding which did work; however, pt desires alternate contraception.  Pt is interested in depoprovera as well as self-administration.  Pt does not desire permanent sterilization.                  GYN & OB History  No LMP recorded. Patient has had an implant.   Date of Last Pap: 2018    OB History    Para Term  AB Living   4         4   SAB TAB Ectopic Multiple Live Births                  # Outcome Date GA Lbr Pastor/2nd Weight Sex Delivery Anes PTL Lv   4             3             2             1                 Review of Systems  Review of Systems   Constitutional: Negative.    Eyes: Negative.    Respiratory: Negative.    Cardiovascular: Negative.    Gastrointestinal: Negative.    Endocrine: Negative.    Genitourinary: Negative.    Musculoskeletal: Negative.    Neurological: Negative.    Hematological: Negative.    Psychiatric/Behavioral: Negative.    Breast: negative.            Objective:    Physical Exam:   Constitutional: She is oriented to person, place, and time. She appears well-developed and well-nourished. No distress.    HENT:   Head: Normocephalic and atraumatic.     Neck: Normal range of motion.    Cardiovascular: Normal  rate.     Pulmonary/Chest: Effort normal. No respiratory distress. Right breast exhibits no inverted nipple, no mass, no nipple discharge, no skin change, no tenderness, presence, no bleeding and no swelling. Left breast exhibits no inverted nipple, no mass, no nipple discharge, no skin change, no tenderness, presence, no bleeding and no swelling.        Abdominal: Soft. She exhibits no distension. There is no tenderness.     Genitourinary: Vagina normal and uterus normal. Pelvic exam was performed with patient supine. There is no rash, tenderness, lesion or injury on the right labia. There is no rash, tenderness, lesion or injury on the left labia. Cervix is normal. Right adnexum displays no mass, no tenderness and no fullness. Left adnexum displays no mass, no tenderness and no fullness. Labial bartholins normal.Additional cervical findings: IUD strings visualized  Genitourinary Comments: IUD strings seen at cervix os; strings grasped with ring forceps and Mirena IUD easily removed.           Musculoskeletal: Normal range of motion and moves all extremeties. She exhibits no tenderness.       Neurological: She is alert and oriented to person, place, and time. No cranial nerve deficit. Coordination normal.    Skin: She is not diaphoretic.    Psychiatric: She has a normal mood and affect. Her behavior is normal. Judgment and thought content normal.          Assessment:        1. Encounter for initial prescription of injectable contraceptive    2. Encounter for IUD removal    3. Gynecologic exam normal               Plan:      1.  IUD removed  2.  Depoprovera 150 mg IM given now in office today; Rx depo-subQ given and pt to start in 12 wks  3.  Pap not indicated at this time.

## 2020-12-21 ENCOUNTER — OFFICE VISIT (OUTPATIENT)
Dept: OBSTETRICS AND GYNECOLOGY | Facility: CLINIC | Age: 35
End: 2020-12-21
Payer: COMMERCIAL

## 2020-12-21 VITALS
WEIGHT: 216 LBS | BODY MASS INDEX: 36.88 KG/M2 | DIASTOLIC BLOOD PRESSURE: 70 MMHG | HEIGHT: 64 IN | SYSTOLIC BLOOD PRESSURE: 120 MMHG

## 2020-12-21 DIAGNOSIS — Z01.419 GYNECOLOGIC EXAM NORMAL: Primary | ICD-10-CM

## 2020-12-21 DIAGNOSIS — Z72.0 TOBACCO ABUSE: ICD-10-CM

## 2020-12-21 DIAGNOSIS — Z97.5 BREAKTHROUGH BLEEDING WITH IUD: ICD-10-CM

## 2020-12-21 DIAGNOSIS — N92.1 BREAKTHROUGH BLEEDING WITH IUD: ICD-10-CM

## 2020-12-21 DIAGNOSIS — N92.0 MENORRHAGIA WITH REGULAR CYCLE: ICD-10-CM

## 2020-12-21 DIAGNOSIS — Z30.42 ENCOUNTER FOR SURVEILLANCE OF INJECTABLE CONTRACEPTIVE: ICD-10-CM

## 2020-12-21 PROCEDURE — 3008F BODY MASS INDEX DOCD: CPT | Mod: CPTII,S$GLB,, | Performed by: OBSTETRICS & GYNECOLOGY

## 2020-12-21 PROCEDURE — 1126F PR PAIN SEVERITY QUANTIFIED, NO PAIN PRESENT: ICD-10-PCS | Mod: S$GLB,,, | Performed by: OBSTETRICS & GYNECOLOGY

## 2020-12-21 PROCEDURE — 88175 CYTOPATH C/V AUTO FLUID REDO: CPT

## 2020-12-21 PROCEDURE — 99999 PR PBB SHADOW E&M-EST. PATIENT-LVL IV: ICD-10-PCS | Mod: PBBFAC,,, | Performed by: OBSTETRICS & GYNECOLOGY

## 2020-12-21 PROCEDURE — 87624 HPV HI-RISK TYP POOLED RSLT: CPT

## 2020-12-21 PROCEDURE — 3008F PR BODY MASS INDEX (BMI) DOCUMENTED: ICD-10-PCS | Mod: CPTII,S$GLB,, | Performed by: OBSTETRICS & GYNECOLOGY

## 2020-12-21 PROCEDURE — 99395 PREV VISIT EST AGE 18-39: CPT | Mod: S$GLB,,, | Performed by: OBSTETRICS & GYNECOLOGY

## 2020-12-21 PROCEDURE — 99395 PR PREVENTIVE VISIT,EST,18-39: ICD-10-PCS | Mod: S$GLB,,, | Performed by: OBSTETRICS & GYNECOLOGY

## 2020-12-21 PROCEDURE — 1126F AMNT PAIN NOTED NONE PRSNT: CPT | Mod: S$GLB,,, | Performed by: OBSTETRICS & GYNECOLOGY

## 2020-12-21 PROCEDURE — 99999 PR PBB SHADOW E&M-EST. PATIENT-LVL IV: CPT | Mod: PBBFAC,,, | Performed by: OBSTETRICS & GYNECOLOGY

## 2020-12-21 NOTE — PROGRESS NOTES
Subjective:       Patient ID: Maggie Obregon is a 35 y.o. female.    Chief Complaint:  Gynecologic Exam and Contraception      History of Present Illness  HPI  Annual Exam-Premenopausal  Patient presents for annual exam. The patient has no complaints today. The patient is sexually active. GYN screening history: last pap: approximate date 2018 and was normal. The patient wears seatbelts: yes. The patient participates in regular exercise: no. Has the patient ever been transfused or tattooed?: yes. The patient reports that there is not domestic violence in her life.    Pt had been using DepoSubQ, last given 20; however, pt states her insurance will no longer cover and pt is interested in alternate contraception or even hysterectomy.  Pt had Mirena IUD previously and had prolonged bleeding.  Currently, with depo injection, pt has gained weight.  Pt is currently a smoker; therefore, pt is not a candidate for combined hormonal contraception.  When pt is not using depoprovera, menses is rather heavy.                  GYN & OB History  No LMP recorded. Patient has had an injection.   Date of Last Pap: 2020    OB History    Para Term  AB Living   4         4   SAB TAB Ectopic Multiple Live Births                  # Outcome Date GA Lbr Pastor/2nd Weight Sex Delivery Anes PTL Lv   4             3             2             1                 Review of Systems  Review of Systems   Constitutional: Negative.    Eyes: Negative.    Respiratory: Negative.    Cardiovascular: Negative.    Gastrointestinal: Negative.    Endocrine: Negative.    Genitourinary: Positive for menorrhagia and menstrual problem. Negative for bladder incontinence, decreased libido, dysmenorrhea, dyspareunia, dysuria, flank pain, frequency, genital sores, hematuria, hot flashes, pelvic pain, urgency, vaginal bleeding, vaginal discharge, vaginal pain, urinary incontinence, postcoital bleeding, postmenopausal  bleeding, vaginal dryness and vaginal odor.   Musculoskeletal: Negative.    Neurological: Negative.    Hematological: Negative.    Psychiatric/Behavioral: Negative.    Breast: negative.            Objective:    Physical Exam:   Constitutional: She is oriented to person, place, and time. She appears well-developed and well-nourished. No distress.    HENT:   Head: Normocephalic and atraumatic.     Neck: Normal range of motion.    Cardiovascular: Normal rate.     Pulmonary/Chest: Effort normal. No respiratory distress. Right breast exhibits no inverted nipple, no mass, no nipple discharge, no skin change, no tenderness, presence, no bleeding and no swelling. Left breast exhibits no inverted nipple, no mass, no nipple discharge, no skin change, no tenderness, presence, no bleeding and no swelling.        Abdominal: Soft. She exhibits no distension and no mass. There is no abdominal tenderness. There is no rebound and no guarding.     Genitourinary:    Vagina, uterus, right adnexa and left adnexa normal.      Pelvic exam was performed with patient supine.   There is no rash, tenderness, lesion or injury on the right labia. There is no rash, tenderness, lesion or injury on the left labia. Cervix is normal. There is a normal right adnexa and a normal left adnexa. Right adnexum displays no mass, no tenderness and no fullness. Left adnexum displays no mass, no tenderness and no fullness. No erythema, tenderness, bleeding, rectocele, cystocele or unspecified prolapse of vaginal walls in the vagina.    No foreign body in the vagina.      No signs of injury in the vagina.   Labial bartholins normal.negative for vaginal discharge          Musculoskeletal: Normal range of motion and moves all extremeties. No tenderness.       Neurological: She is alert and oriented to person, place, and time. No cranial nerve deficit. Coordination normal.    Skin: She is not diaphoretic.    Psychiatric: She has a normal mood and affect. Her  behavior is normal. Judgment and thought content normal.          Assessment:        1. Gynecologic exam normal    2. Encounter for surveillance of injectable contraceptive    3. Menorrhagia with regular cycle    4. Breakthrough bleeding with IUD     5.  Tobacco abuse        Plan:      1.  Pap and HR HPV collected   2.  Stressed smoking cessation  3.  Pt desires TLH, scheduled 2/17/21 w/ pre-op 1 wk prior  4.  Pelvic sono ordered

## 2020-12-28 LAB
HPV HR 12 DNA SPEC QL NAA+PROBE: NEGATIVE
HPV16 AG SPEC QL: NEGATIVE
HPV18 DNA SPEC QL NAA+PROBE: NEGATIVE

## 2021-01-22 ENCOUNTER — HOSPITAL ENCOUNTER (OUTPATIENT)
Dept: RADIOLOGY | Facility: HOSPITAL | Age: 36
Discharge: HOME | End: 2021-01-22
Attending: OBSTETRICS & GYNECOLOGY
Payer: COMMERCIAL

## 2021-01-22 DIAGNOSIS — N92.0 MENORRHAGIA WITH REGULAR CYCLE: ICD-10-CM

## 2021-01-22 PROCEDURE — 76856 US PELVIS COMP WITH TRANSVAG NON-OB (XPD): ICD-10-PCS | Mod: 26,,, | Performed by: RADIOLOGY

## 2021-01-22 PROCEDURE — 76830 TRANSVAGINAL US NON-OB: CPT | Mod: 26,,, | Performed by: RADIOLOGY

## 2021-01-22 PROCEDURE — 76856 US EXAM PELVIC COMPLETE: CPT | Mod: TC

## 2021-01-22 PROCEDURE — 76830 US PELVIS COMP WITH TRANSVAG NON-OB (XPD): ICD-10-PCS | Mod: 26,,, | Performed by: RADIOLOGY

## 2021-01-22 PROCEDURE — 76856 US EXAM PELVIC COMPLETE: CPT | Mod: 26,,, | Performed by: RADIOLOGY

## 2021-01-29 LAB
FINAL PATHOLOGIC DIAGNOSIS: NORMAL
Lab: NORMAL

## 2021-02-03 ENCOUNTER — PATIENT MESSAGE (OUTPATIENT)
Dept: OBSTETRICS AND GYNECOLOGY | Facility: CLINIC | Age: 36
End: 2021-02-03

## 2021-02-12 ENCOUNTER — HOSPITAL ENCOUNTER (OUTPATIENT)
Dept: PREADMISSION TESTING | Facility: HOSPITAL | Age: 36
Discharge: HOME | End: 2021-02-12
Attending: OBSTETRICS & GYNECOLOGY
Payer: COMMERCIAL

## 2021-02-12 ENCOUNTER — OFFICE VISIT (OUTPATIENT)
Dept: OBSTETRICS AND GYNECOLOGY | Facility: CLINIC | Age: 36
End: 2021-02-12
Payer: COMMERCIAL

## 2021-02-12 VITALS
SYSTOLIC BLOOD PRESSURE: 110 MMHG | WEIGHT: 225.44 LBS | DIASTOLIC BLOOD PRESSURE: 64 MMHG | BODY MASS INDEX: 38.69 KG/M2

## 2021-02-12 VITALS
BODY MASS INDEX: 38.54 KG/M2 | WEIGHT: 225.75 LBS | HEART RATE: 67 BPM | DIASTOLIC BLOOD PRESSURE: 75 MMHG | OXYGEN SATURATION: 99 % | HEIGHT: 64 IN | RESPIRATION RATE: 16 BRPM | SYSTOLIC BLOOD PRESSURE: 104 MMHG | TEMPERATURE: 98 F

## 2021-02-12 DIAGNOSIS — N92.1 BREAKTHROUGH BLEEDING WITH IUD: ICD-10-CM

## 2021-02-12 DIAGNOSIS — Z97.5 BREAKTHROUGH BLEEDING WITH IUD: ICD-10-CM

## 2021-02-12 DIAGNOSIS — N92.0 MENORRHAGIA WITH REGULAR CYCLE: ICD-10-CM

## 2021-02-12 DIAGNOSIS — N92.0 MENORRHAGIA WITH REGULAR CYCLE: Primary | ICD-10-CM

## 2021-02-12 DIAGNOSIS — Z01.818 PRE-OP TESTING: Primary | ICD-10-CM

## 2021-02-12 LAB
ALBUMIN SERPL BCP-MCNC: 4 G/DL (ref 3.5–5.2)
ALP SERPL-CCNC: 82 U/L (ref 55–135)
ALT SERPL W/O P-5'-P-CCNC: 21 U/L (ref 10–44)
ANION GAP SERPL CALC-SCNC: 6 MMOL/L (ref 8–16)
AST SERPL-CCNC: 13 U/L (ref 10–40)
BASOPHILS # BLD AUTO: 0.03 K/UL (ref 0–0.2)
BASOPHILS NFR BLD: 0.3 % (ref 0–1.9)
BILIRUB SERPL-MCNC: 0.3 MG/DL (ref 0.1–1)
BUN SERPL-MCNC: 15 MG/DL (ref 6–20)
CALCIUM SERPL-MCNC: 8.9 MG/DL (ref 8.7–10.5)
CHLORIDE SERPL-SCNC: 109 MMOL/L (ref 95–110)
CO2 SERPL-SCNC: 23 MMOL/L (ref 23–29)
CREAT SERPL-MCNC: 0.8 MG/DL (ref 0.5–1.4)
DIFFERENTIAL METHOD: ABNORMAL
EOSINOPHIL # BLD AUTO: 0.2 K/UL (ref 0–0.5)
EOSINOPHIL NFR BLD: 2.3 % (ref 0–8)
ERYTHROCYTE [DISTWIDTH] IN BLOOD BY AUTOMATED COUNT: 12.3 % (ref 11.5–14.5)
EST. GFR  (AFRICAN AMERICAN): >60 ML/MIN/1.73 M^2
EST. GFR  (NON AFRICAN AMERICAN): >60 ML/MIN/1.73 M^2
ESTIMATED AVG GLUCOSE: 100 MG/DL (ref 68–131)
GLUCOSE SERPL-MCNC: 101 MG/DL (ref 70–110)
HBA1C MFR BLD: 5.1 % (ref 4–5.6)
HCT VFR BLD AUTO: 40.7 % (ref 37–48.5)
HGB BLD-MCNC: 13.4 G/DL (ref 12–16)
IMM GRANULOCYTES # BLD AUTO: 0.06 K/UL (ref 0–0.04)
IMM GRANULOCYTES NFR BLD AUTO: 0.6 % (ref 0–0.5)
LYMPHOCYTES # BLD AUTO: 4.3 K/UL (ref 1–4.8)
LYMPHOCYTES NFR BLD: 43.7 % (ref 18–48)
MCH RBC QN AUTO: 29.1 PG (ref 27–31)
MCHC RBC AUTO-ENTMCNC: 32.9 G/DL (ref 32–36)
MCV RBC AUTO: 88 FL (ref 82–98)
MONOCYTES # BLD AUTO: 0.6 K/UL (ref 0.3–1)
MONOCYTES NFR BLD: 5.9 % (ref 4–15)
NEUTROPHILS # BLD AUTO: 4.6 K/UL (ref 1.8–7.7)
NEUTROPHILS NFR BLD: 47.2 % (ref 38–73)
NRBC BLD-RTO: 0 /100 WBC
PLATELET # BLD AUTO: 230 K/UL (ref 150–350)
PMV BLD AUTO: 9.2 FL (ref 9.2–12.9)
POTASSIUM SERPL-SCNC: 3.9 MMOL/L (ref 3.5–5.1)
PROT SERPL-MCNC: 7 G/DL (ref 6–8.4)
RBC # BLD AUTO: 4.61 M/UL (ref 4–5.4)
SODIUM SERPL-SCNC: 138 MMOL/L (ref 136–145)
WBC # BLD AUTO: 9.72 K/UL (ref 3.9–12.7)

## 2021-02-12 PROCEDURE — 36415 COLL VENOUS BLD VENIPUNCTURE: CPT

## 2021-02-12 PROCEDURE — 1126F AMNT PAIN NOTED NONE PRSNT: CPT | Mod: S$GLB,,, | Performed by: OBSTETRICS & GYNECOLOGY

## 2021-02-12 PROCEDURE — 3008F BODY MASS INDEX DOCD: CPT | Mod: CPTII,S$GLB,, | Performed by: OBSTETRICS & GYNECOLOGY

## 2021-02-12 PROCEDURE — 1126F PR PAIN SEVERITY QUANTIFIED, NO PAIN PRESENT: ICD-10-PCS | Mod: S$GLB,,, | Performed by: OBSTETRICS & GYNECOLOGY

## 2021-02-12 PROCEDURE — 99499 UNLISTED E&M SERVICE: CPT | Mod: S$GLB,,, | Performed by: OBSTETRICS & GYNECOLOGY

## 2021-02-12 PROCEDURE — 83036 HEMOGLOBIN GLYCOSYLATED A1C: CPT

## 2021-02-12 PROCEDURE — 80053 COMPREHEN METABOLIC PANEL: CPT

## 2021-02-12 PROCEDURE — 99999 PR PBB SHADOW E&M-EST. PATIENT-LVL III: CPT | Mod: PBBFAC,,, | Performed by: OBSTETRICS & GYNECOLOGY

## 2021-02-12 PROCEDURE — 3008F PR BODY MASS INDEX (BMI) DOCUMENTED: ICD-10-PCS | Mod: CPTII,S$GLB,, | Performed by: OBSTETRICS & GYNECOLOGY

## 2021-02-12 PROCEDURE — 99999 PR PBB SHADOW E&M-EST. PATIENT-LVL III: ICD-10-PCS | Mod: PBBFAC,,, | Performed by: OBSTETRICS & GYNECOLOGY

## 2021-02-12 PROCEDURE — 85025 COMPLETE CBC W/AUTO DIFF WBC: CPT

## 2021-02-12 PROCEDURE — 99499 NO LOS: ICD-10-PCS | Mod: S$GLB,,, | Performed by: OBSTETRICS & GYNECOLOGY

## 2021-02-12 RX ORDER — MUPIROCIN 20 MG/G
OINTMENT TOPICAL
Status: CANCELLED | OUTPATIENT
Start: 2021-02-12

## 2021-02-12 RX ORDER — MULTIVITAMIN
1 TABLET ORAL DAILY
COMMUNITY

## 2021-02-15 ENCOUNTER — HOSPITAL ENCOUNTER (OUTPATIENT)
Dept: PREADMISSION TESTING | Facility: HOSPITAL | Age: 36
Discharge: HOME | End: 2021-02-15
Attending: OBSTETRICS & GYNECOLOGY
Payer: COMMERCIAL

## 2021-02-15 DIAGNOSIS — N92.0 MENORRHAGIA WITH REGULAR CYCLE: ICD-10-CM

## 2021-02-15 DIAGNOSIS — Z97.5 BREAKTHROUGH BLEEDING WITH IUD: ICD-10-CM

## 2021-02-15 DIAGNOSIS — Z01.818 PREOPERATIVE TESTING: Primary | ICD-10-CM

## 2021-02-15 DIAGNOSIS — N92.1 BREAKTHROUGH BLEEDING WITH IUD: ICD-10-CM

## 2021-02-15 DIAGNOSIS — Z01.812 ENCOUNTER FOR PREOPERATIVE SCREENING LABORATORY TESTING FOR COVID-19 VIRUS: ICD-10-CM

## 2021-02-15 DIAGNOSIS — Z01.818 PRE-OP TESTING: ICD-10-CM

## 2021-02-15 DIAGNOSIS — Z11.52 ENCOUNTER FOR PREOPERATIVE SCREENING LABORATORY TESTING FOR COVID-19 VIRUS: ICD-10-CM

## 2021-02-15 LAB
ABO + RH BLD: NORMAL
B-HCG UR QL: NEGATIVE
BLD GP AB SCN CELLS X3 SERPL QL: NORMAL
SARS-COV-2 RDRP RESP QL NAA+PROBE: NEGATIVE

## 2021-02-15 PROCEDURE — 36415 COLL VENOUS BLD VENIPUNCTURE: CPT

## 2021-02-15 PROCEDURE — 86900 BLOOD TYPING SEROLOGIC ABO: CPT

## 2021-02-15 PROCEDURE — 81025 URINE PREGNANCY TEST: CPT

## 2021-02-15 PROCEDURE — U0002 COVID-19 LAB TEST NON-CDC: HCPCS

## 2021-02-16 ENCOUNTER — ANESTHESIA EVENT (OUTPATIENT)
Dept: SURGERY | Facility: HOSPITAL | Age: 36
End: 2021-02-16
Payer: COMMERCIAL

## 2021-02-17 ENCOUNTER — ANESTHESIA (OUTPATIENT)
Dept: SURGERY | Facility: HOSPITAL | Age: 36
End: 2021-02-17
Payer: COMMERCIAL

## 2021-02-17 ENCOUNTER — HOSPITAL ENCOUNTER (OUTPATIENT)
Facility: HOSPITAL | Age: 36
Discharge: HOME | End: 2021-02-17
Attending: OBSTETRICS & GYNECOLOGY | Admitting: OBSTETRICS & GYNECOLOGY
Payer: COMMERCIAL

## 2021-02-17 VITALS
TEMPERATURE: 98 F | DIASTOLIC BLOOD PRESSURE: 75 MMHG | OXYGEN SATURATION: 97 % | HEART RATE: 71 BPM | WEIGHT: 225.75 LBS | BODY MASS INDEX: 38.75 KG/M2 | SYSTOLIC BLOOD PRESSURE: 124 MMHG | RESPIRATION RATE: 18 BRPM

## 2021-02-17 DIAGNOSIS — N92.1 BREAKTHROUGH BLEEDING WITH IUD: ICD-10-CM

## 2021-02-17 DIAGNOSIS — Z97.5 BREAKTHROUGH BLEEDING WITH IUD: ICD-10-CM

## 2021-02-17 DIAGNOSIS — Z90.710 S/P TOTAL HYSTERECTOMY: Primary | ICD-10-CM

## 2021-02-17 DIAGNOSIS — Z11.52 ENCOUNTER FOR PREOPERATIVE SCREENING LABORATORY TESTING FOR COVID-19 VIRUS: ICD-10-CM

## 2021-02-17 DIAGNOSIS — N92.0 MENORRHAGIA WITH REGULAR CYCLE: ICD-10-CM

## 2021-02-17 DIAGNOSIS — Z01.812 ENCOUNTER FOR PREOPERATIVE SCREENING LABORATORY TESTING FOR COVID-19 VIRUS: ICD-10-CM

## 2021-02-17 LAB — POCT GLUCOSE: 97 MG/DL (ref 70–110)

## 2021-02-17 PROCEDURE — 63600175 PHARM REV CODE 636 W HCPCS: Performed by: OBSTETRICS & GYNECOLOGY

## 2021-02-17 PROCEDURE — 63600175 PHARM REV CODE 636 W HCPCS: Performed by: NURSE ANESTHETIST, CERTIFIED REGISTERED

## 2021-02-17 PROCEDURE — 58571 PR LAPAROSCOPY W TOT HYSTERECTUTERUS <=250 GRAM  W TUBE/OVARY: ICD-10-PCS | Mod: 80,,, | Performed by: OBSTETRICS & GYNECOLOGY

## 2021-02-17 PROCEDURE — 71000016 HC POSTOP RECOV ADDL HR: Performed by: OBSTETRICS & GYNECOLOGY

## 2021-02-17 PROCEDURE — 25000003 PHARM REV CODE 250: Performed by: OBSTETRICS & GYNECOLOGY

## 2021-02-17 PROCEDURE — 58571 PR LAPAROSCOPY W TOT HYSTERECTUTERUS <=250 GRAM  W TUBE/OVARY: ICD-10-PCS | Mod: ,,, | Performed by: OBSTETRICS & GYNECOLOGY

## 2021-02-17 PROCEDURE — 36000711: Performed by: OBSTETRICS & GYNECOLOGY

## 2021-02-17 PROCEDURE — 63600175 PHARM REV CODE 636 W HCPCS: Performed by: ANESTHESIOLOGY

## 2021-02-17 PROCEDURE — C2628 CATHETER, OCCLUSION: HCPCS | Performed by: OBSTETRICS & GYNECOLOGY

## 2021-02-17 PROCEDURE — 36000710: Performed by: OBSTETRICS & GYNECOLOGY

## 2021-02-17 PROCEDURE — D9220A PRA ANESTHESIA: ICD-10-PCS | Mod: ,,, | Performed by: ANESTHESIOLOGY

## 2021-02-17 PROCEDURE — D9220A PRA ANESTHESIA: Mod: ,,, | Performed by: ANESTHESIOLOGY

## 2021-02-17 PROCEDURE — 58571 TLH W/T/O 250 G OR LESS: CPT | Mod: ,,, | Performed by: OBSTETRICS & GYNECOLOGY

## 2021-02-17 PROCEDURE — 37000009 HC ANESTHESIA EA ADD 15 MINS: Performed by: OBSTETRICS & GYNECOLOGY

## 2021-02-17 PROCEDURE — 82962 GLUCOSE BLOOD TEST: CPT | Performed by: OBSTETRICS & GYNECOLOGY

## 2021-02-17 PROCEDURE — D9220A PRA ANESTHESIA: ICD-10-PCS | Mod: ,,, | Performed by: NURSE ANESTHETIST, CERTIFIED REGISTERED

## 2021-02-17 PROCEDURE — 58571 TLH W/T/O 250 G OR LESS: CPT | Mod: 80,,, | Performed by: OBSTETRICS & GYNECOLOGY

## 2021-02-17 PROCEDURE — 37000008 HC ANESTHESIA 1ST 15 MINUTES: Performed by: OBSTETRICS & GYNECOLOGY

## 2021-02-17 PROCEDURE — 88307 PR  SURG PATH,LEVEL V: ICD-10-PCS | Mod: 26,,, | Performed by: PATHOLOGY

## 2021-02-17 PROCEDURE — 88307 TISSUE EXAM BY PATHOLOGIST: CPT | Performed by: PATHOLOGY

## 2021-02-17 PROCEDURE — 71000033 HC RECOVERY, INTIAL HOUR: Performed by: OBSTETRICS & GYNECOLOGY

## 2021-02-17 PROCEDURE — 71000015 HC POSTOP RECOV 1ST HR: Performed by: OBSTETRICS & GYNECOLOGY

## 2021-02-17 PROCEDURE — 25000003 PHARM REV CODE 250: Performed by: NURSE ANESTHETIST, CERTIFIED REGISTERED

## 2021-02-17 PROCEDURE — 88307 TISSUE EXAM BY PATHOLOGIST: CPT | Mod: 26,,, | Performed by: PATHOLOGY

## 2021-02-17 PROCEDURE — D9220A PRA ANESTHESIA: Mod: ,,, | Performed by: NURSE ANESTHETIST, CERTIFIED REGISTERED

## 2021-02-17 PROCEDURE — 27201423 OPTIME MED/SURG SUP & DEVICES STERILE SUPPLY: Performed by: OBSTETRICS & GYNECOLOGY

## 2021-02-17 RX ORDER — HYDROCODONE BITARTRATE AND ACETAMINOPHEN 5; 325 MG/1; MG/1
1 TABLET ORAL EVERY 4 HOURS PRN
Qty: 20 TABLET | Refills: 0 | Status: SHIPPED | OUTPATIENT
Start: 2021-02-17

## 2021-02-17 RX ORDER — HYDROCODONE BITARTRATE AND ACETAMINOPHEN 10; 325 MG/1; MG/1
1 TABLET ORAL EVERY 4 HOURS PRN
Status: DISCONTINUED | OUTPATIENT
Start: 2021-02-17 | End: 2021-02-17 | Stop reason: HOSPADM

## 2021-02-17 RX ORDER — SODIUM CHLORIDE 0.9 G/100ML
IRRIGANT IRRIGATION
Status: DISCONTINUED | OUTPATIENT
Start: 2021-02-17 | End: 2021-02-17 | Stop reason: HOSPADM

## 2021-02-17 RX ORDER — HYDROMORPHONE HYDROCHLORIDE 2 MG/ML
0.2 INJECTION, SOLUTION INTRAMUSCULAR; INTRAVENOUS; SUBCUTANEOUS EVERY 5 MIN PRN
Status: DISCONTINUED | OUTPATIENT
Start: 2021-02-17 | End: 2021-02-17 | Stop reason: HOSPADM

## 2021-02-17 RX ORDER — SUCCINYLCHOLINE CHLORIDE 20 MG/ML
INJECTION INTRAMUSCULAR; INTRAVENOUS
Status: DISCONTINUED | OUTPATIENT
Start: 2021-02-17 | End: 2021-02-17

## 2021-02-17 RX ORDER — NEOSTIGMINE METHYLSULFATE 1 MG/ML
INJECTION, SOLUTION INTRAVENOUS
Status: DISCONTINUED | OUTPATIENT
Start: 2021-02-17 | End: 2021-02-17

## 2021-02-17 RX ORDER — DIPHENHYDRAMINE HCL 25 MG
25 CAPSULE ORAL EVERY 4 HOURS PRN
Status: DISCONTINUED | OUTPATIENT
Start: 2021-02-17 | End: 2021-02-17 | Stop reason: HOSPADM

## 2021-02-17 RX ORDER — CEFAZOLIN SODIUM 2 G/50ML
2 SOLUTION INTRAVENOUS
Status: DISCONTINUED | OUTPATIENT
Start: 2021-02-17 | End: 2021-02-17 | Stop reason: HOSPADM

## 2021-02-17 RX ORDER — IBUPROFEN 800 MG/1
800 TABLET ORAL 3 TIMES DAILY
Qty: 60 TABLET | Refills: 0 | Status: SHIPPED | OUTPATIENT
Start: 2021-02-17

## 2021-02-17 RX ORDER — MUPIROCIN 20 MG/G
OINTMENT TOPICAL
Status: DISCONTINUED | OUTPATIENT
Start: 2021-02-17 | End: 2021-02-17 | Stop reason: HOSPADM

## 2021-02-17 RX ORDER — PROPOFOL 10 MG/ML
VIAL (ML) INTRAVENOUS
Status: DISCONTINUED | OUTPATIENT
Start: 2021-02-17 | End: 2021-02-17

## 2021-02-17 RX ORDER — IBUPROFEN 400 MG/1
800 TABLET ORAL 3 TIMES DAILY
Status: DISCONTINUED | OUTPATIENT
Start: 2021-02-17 | End: 2021-02-17 | Stop reason: HOSPADM

## 2021-02-17 RX ORDER — SODIUM CHLORIDE 0.9 % (FLUSH) 0.9 %
10 SYRINGE (ML) INJECTION
Status: DISCONTINUED | OUTPATIENT
Start: 2021-02-17 | End: 2021-02-17 | Stop reason: HOSPADM

## 2021-02-17 RX ORDER — MIDAZOLAM HYDROCHLORIDE 1 MG/ML
INJECTION, SOLUTION INTRAMUSCULAR; INTRAVENOUS
Status: DISCONTINUED | OUTPATIENT
Start: 2021-02-17 | End: 2021-02-17

## 2021-02-17 RX ORDER — FENTANYL CITRATE 50 UG/ML
INJECTION, SOLUTION INTRAMUSCULAR; INTRAVENOUS
Status: DISCONTINUED | OUTPATIENT
Start: 2021-02-17 | End: 2021-02-17

## 2021-02-17 RX ORDER — DEXAMETHASONE SODIUM PHOSPHATE 4 MG/ML
INJECTION, SOLUTION INTRA-ARTICULAR; INTRALESIONAL; INTRAMUSCULAR; INTRAVENOUS; SOFT TISSUE
Status: DISCONTINUED | OUTPATIENT
Start: 2021-02-17 | End: 2021-02-17

## 2021-02-17 RX ORDER — LIDOCAINE HYDROCHLORIDE 20 MG/ML
INJECTION INTRAVENOUS
Status: DISCONTINUED | OUTPATIENT
Start: 2021-02-17 | End: 2021-02-17

## 2021-02-17 RX ORDER — ONDANSETRON 2 MG/ML
4 INJECTION INTRAMUSCULAR; INTRAVENOUS DAILY PRN
Status: DISCONTINUED | OUTPATIENT
Start: 2021-02-17 | End: 2021-02-17 | Stop reason: HOSPADM

## 2021-02-17 RX ORDER — HYDROCODONE BITARTRATE AND ACETAMINOPHEN 5; 325 MG/1; MG/1
1 TABLET ORAL EVERY 4 HOURS PRN
Status: DISCONTINUED | OUTPATIENT
Start: 2021-02-17 | End: 2021-02-17 | Stop reason: HOSPADM

## 2021-02-17 RX ORDER — DIPHENHYDRAMINE HYDROCHLORIDE 50 MG/ML
25 INJECTION INTRAMUSCULAR; INTRAVENOUS EVERY 4 HOURS PRN
Status: DISCONTINUED | OUTPATIENT
Start: 2021-02-17 | End: 2021-02-17 | Stop reason: HOSPADM

## 2021-02-17 RX ORDER — ROCURONIUM BROMIDE 10 MG/ML
INJECTION, SOLUTION INTRAVENOUS
Status: DISCONTINUED | OUTPATIENT
Start: 2021-02-17 | End: 2021-02-17

## 2021-02-17 RX ORDER — ONDANSETRON 4 MG/1
8 TABLET, ORALLY DISINTEGRATING ORAL EVERY 8 HOURS PRN
Status: DISCONTINUED | OUTPATIENT
Start: 2021-02-17 | End: 2021-02-17 | Stop reason: HOSPADM

## 2021-02-17 RX ORDER — SODIUM CHLORIDE, SODIUM LACTATE, POTASSIUM CHLORIDE, CALCIUM CHLORIDE 600; 310; 30; 20 MG/100ML; MG/100ML; MG/100ML; MG/100ML
INJECTION, SOLUTION INTRAVENOUS CONTINUOUS
Status: DISCONTINUED | OUTPATIENT
Start: 2021-02-17 | End: 2021-02-17 | Stop reason: HOSPADM

## 2021-02-17 RX ORDER — ONDANSETRON 2 MG/ML
INJECTION INTRAMUSCULAR; INTRAVENOUS
Status: DISCONTINUED | OUTPATIENT
Start: 2021-02-17 | End: 2021-02-17

## 2021-02-17 RX ADMIN — ROCURONIUM BROMIDE 20 MG: 10 INJECTION, SOLUTION INTRAVENOUS at 07:02

## 2021-02-17 RX ADMIN — MUPIROCIN: 20 OINTMENT TOPICAL at 06:02

## 2021-02-17 RX ADMIN — CEFAZOLIN SODIUM 2 G: 2 SOLUTION INTRAVENOUS at 07:02

## 2021-02-17 RX ADMIN — Medication 100 MG: at 07:02

## 2021-02-17 RX ADMIN — GLYCOPYRROLATE 0.8 MCG: 0.2 INJECTION, SOLUTION INTRAMUSCULAR; INTRAVITREAL at 08:02

## 2021-02-17 RX ADMIN — SUCCINYLCHOLINE CHLORIDE 120 MG: 20 INJECTION, SOLUTION INTRAMUSCULAR; INTRAVENOUS at 07:02

## 2021-02-17 RX ADMIN — HYDROMORPHONE HYDROCHLORIDE 0.2 MG: 2 INJECTION INTRAMUSCULAR; INTRAVENOUS; SUBCUTANEOUS at 09:02

## 2021-02-17 RX ADMIN — FENTANYL CITRATE 50 MCG: 50 INJECTION, SOLUTION INTRAMUSCULAR; INTRAVENOUS at 08:02

## 2021-02-17 RX ADMIN — NEOSTIGMINE METHYLSULFATE 5 MG: 1 INJECTION INTRAVENOUS at 08:02

## 2021-02-17 RX ADMIN — ESMOLOL HYDROCHLORIDE 40 MG: 10 INJECTION INTRAVENOUS at 07:02

## 2021-02-17 RX ADMIN — DEXAMETHASONE SODIUM PHOSPHATE 4 MG: 4 INJECTION, SOLUTION INTRAMUSCULAR; INTRAVENOUS at 07:02

## 2021-02-17 RX ADMIN — ONDANSETRON 4 MG: 2 INJECTION, SOLUTION INTRAMUSCULAR; INTRAVENOUS at 07:02

## 2021-02-17 RX ADMIN — FENTANYL CITRATE 50 MCG: 50 INJECTION, SOLUTION INTRAMUSCULAR; INTRAVENOUS at 07:02

## 2021-02-17 RX ADMIN — PROPOFOL 50 MG: 10 INJECTION, EMULSION INTRAVENOUS at 08:02

## 2021-02-17 RX ADMIN — PROPOFOL 50 MG: 10 INJECTION, EMULSION INTRAVENOUS at 07:02

## 2021-02-17 RX ADMIN — HYDROCODONE BITARTRATE AND ACETAMINOPHEN 1 TABLET: 10; 325 TABLET ORAL at 10:02

## 2021-02-17 RX ADMIN — FENTANYL CITRATE 100 MCG: 50 INJECTION, SOLUTION INTRAMUSCULAR; INTRAVENOUS at 07:02

## 2021-02-17 RX ADMIN — ROCURONIUM BROMIDE 30 MG: 10 INJECTION, SOLUTION INTRAVENOUS at 07:02

## 2021-02-17 RX ADMIN — MIDAZOLAM HYDROCHLORIDE 2 MG: 1 INJECTION, SOLUTION INTRAMUSCULAR; INTRAVENOUS at 07:02

## 2021-02-17 RX ADMIN — PROPOFOL 200 MG: 10 INJECTION, EMULSION INTRAVENOUS at 07:02

## 2021-02-18 ENCOUNTER — TELEPHONE (OUTPATIENT)
Dept: OBSTETRICS AND GYNECOLOGY | Facility: CLINIC | Age: 36
End: 2021-02-18

## 2021-02-22 LAB
FINAL PATHOLOGIC DIAGNOSIS: NORMAL
GROSS: NORMAL
Lab: NORMAL

## 2021-03-09 ENCOUNTER — TELEPHONE (OUTPATIENT)
Dept: OBSTETRICS AND GYNECOLOGY | Facility: CLINIC | Age: 36
End: 2021-03-09

## 2021-03-10 ENCOUNTER — PATIENT MESSAGE (OUTPATIENT)
Dept: OBSTETRICS AND GYNECOLOGY | Facility: CLINIC | Age: 36
End: 2021-03-10

## 2021-03-12 ENCOUNTER — OFFICE VISIT (OUTPATIENT)
Dept: OBSTETRICS AND GYNECOLOGY | Facility: CLINIC | Age: 36
End: 2021-03-12
Payer: COMMERCIAL

## 2021-03-12 VITALS
WEIGHT: 222.88 LBS | BODY MASS INDEX: 38.26 KG/M2 | SYSTOLIC BLOOD PRESSURE: 112 MMHG | DIASTOLIC BLOOD PRESSURE: 72 MMHG

## 2021-03-12 DIAGNOSIS — Z09 POSTOPERATIVE EXAMINATION: ICD-10-CM

## 2021-03-12 DIAGNOSIS — N89.8 VAGINAL DISCHARGE: Primary | ICD-10-CM

## 2021-03-12 PROCEDURE — 3008F BODY MASS INDEX DOCD: CPT | Mod: CPTII,S$GLB,, | Performed by: OBSTETRICS & GYNECOLOGY

## 2021-03-12 PROCEDURE — 99024 POSTOP FOLLOW-UP VISIT: CPT | Mod: S$GLB,,, | Performed by: OBSTETRICS & GYNECOLOGY

## 2021-03-12 PROCEDURE — 99024 PR POST-OP FOLLOW-UP VISIT: ICD-10-PCS | Mod: S$GLB,,, | Performed by: OBSTETRICS & GYNECOLOGY

## 2021-03-12 PROCEDURE — 99999 PR PBB SHADOW E&M-EST. PATIENT-LVL II: CPT | Mod: PBBFAC,,, | Performed by: OBSTETRICS & GYNECOLOGY

## 2021-03-12 PROCEDURE — 3008F PR BODY MASS INDEX (BMI) DOCUMENTED: ICD-10-PCS | Mod: CPTII,S$GLB,, | Performed by: OBSTETRICS & GYNECOLOGY

## 2021-03-12 PROCEDURE — 99999 PR PBB SHADOW E&M-EST. PATIENT-LVL II: ICD-10-PCS | Mod: PBBFAC,,, | Performed by: OBSTETRICS & GYNECOLOGY

## 2021-03-12 RX ORDER — METRONIDAZOLE 500 MG/1
500 TABLET ORAL EVERY 12 HOURS
Qty: 14 TABLET | Refills: 0 | Status: SHIPPED | OUTPATIENT
Start: 2021-03-12 | End: 2021-03-19

## 2021-04-05 ENCOUNTER — OFFICE VISIT (OUTPATIENT)
Dept: OBSTETRICS AND GYNECOLOGY | Facility: CLINIC | Age: 36
End: 2021-04-05
Payer: COMMERCIAL

## 2021-04-05 VITALS
BODY MASS INDEX: 37.8 KG/M2 | DIASTOLIC BLOOD PRESSURE: 76 MMHG | WEIGHT: 221.44 LBS | HEIGHT: 64 IN | SYSTOLIC BLOOD PRESSURE: 119 MMHG

## 2021-04-05 DIAGNOSIS — Z90.710 S/P TOTAL HYSTERECTOMY: ICD-10-CM

## 2021-04-05 DIAGNOSIS — Z09 POSTOPERATIVE EXAMINATION: Primary | ICD-10-CM

## 2021-04-05 PROCEDURE — 3008F PR BODY MASS INDEX (BMI) DOCUMENTED: ICD-10-PCS | Mod: CPTII,S$GLB,, | Performed by: OBSTETRICS & GYNECOLOGY

## 2021-04-05 PROCEDURE — 99024 PR POST-OP FOLLOW-UP VISIT: ICD-10-PCS | Mod: S$GLB,,, | Performed by: OBSTETRICS & GYNECOLOGY

## 2021-04-05 PROCEDURE — 99999 PR PBB SHADOW E&M-EST. PATIENT-LVL III: CPT | Mod: PBBFAC,,, | Performed by: OBSTETRICS & GYNECOLOGY

## 2021-04-05 PROCEDURE — 99024 POSTOP FOLLOW-UP VISIT: CPT | Mod: S$GLB,,, | Performed by: OBSTETRICS & GYNECOLOGY

## 2021-04-05 PROCEDURE — 1126F AMNT PAIN NOTED NONE PRSNT: CPT | Mod: S$GLB,,, | Performed by: OBSTETRICS & GYNECOLOGY

## 2021-04-05 PROCEDURE — 1126F PR PAIN SEVERITY QUANTIFIED, NO PAIN PRESENT: ICD-10-PCS | Mod: S$GLB,,, | Performed by: OBSTETRICS & GYNECOLOGY

## 2021-04-05 PROCEDURE — 99999 PR PBB SHADOW E&M-EST. PATIENT-LVL III: ICD-10-PCS | Mod: PBBFAC,,, | Performed by: OBSTETRICS & GYNECOLOGY

## 2021-04-05 PROCEDURE — 3008F BODY MASS INDEX DOCD: CPT | Mod: CPTII,S$GLB,, | Performed by: OBSTETRICS & GYNECOLOGY

## 2022-04-26 ENCOUNTER — OFFICE VISIT (OUTPATIENT)
Dept: OBSTETRICS AND GYNECOLOGY | Facility: CLINIC | Age: 37
End: 2022-04-26
Payer: COMMERCIAL

## 2022-04-26 VITALS
BODY MASS INDEX: 35.02 KG/M2 | SYSTOLIC BLOOD PRESSURE: 116 MMHG | DIASTOLIC BLOOD PRESSURE: 70 MMHG | WEIGHT: 204.06 LBS

## 2022-04-26 DIAGNOSIS — Z01.419 GYNECOLOGIC EXAM NORMAL: ICD-10-CM

## 2022-04-26 DIAGNOSIS — Z90.710 S/P TOTAL HYSTERECTOMY: Primary | ICD-10-CM

## 2022-04-26 DIAGNOSIS — Z72.0 TOBACCO ABUSE: ICD-10-CM

## 2022-04-26 PROBLEM — N92.0 MENORRHAGIA WITH REGULAR CYCLE: Status: RESOLVED | Noted: 2020-12-21 | Resolved: 2022-04-26

## 2022-04-26 PROCEDURE — 1159F PR MEDICATION LIST DOCUMENTED IN MEDICAL RECORD: ICD-10-PCS | Mod: CPTII,S$GLB,, | Performed by: OBSTETRICS & GYNECOLOGY

## 2022-04-26 PROCEDURE — 3078F PR MOST RECENT DIASTOLIC BLOOD PRESSURE < 80 MM HG: ICD-10-PCS | Mod: CPTII,S$GLB,, | Performed by: OBSTETRICS & GYNECOLOGY

## 2022-04-26 PROCEDURE — 3008F BODY MASS INDEX DOCD: CPT | Mod: CPTII,S$GLB,, | Performed by: OBSTETRICS & GYNECOLOGY

## 2022-04-26 PROCEDURE — 3074F PR MOST RECENT SYSTOLIC BLOOD PRESSURE < 130 MM HG: ICD-10-PCS | Mod: CPTII,S$GLB,, | Performed by: OBSTETRICS & GYNECOLOGY

## 2022-04-26 PROCEDURE — 99999 PR PBB SHADOW E&M-EST. PATIENT-LVL III: ICD-10-PCS | Mod: PBBFAC,,, | Performed by: OBSTETRICS & GYNECOLOGY

## 2022-04-26 PROCEDURE — 3008F PR BODY MASS INDEX (BMI) DOCUMENTED: ICD-10-PCS | Mod: CPTII,S$GLB,, | Performed by: OBSTETRICS & GYNECOLOGY

## 2022-04-26 PROCEDURE — 1159F MED LIST DOCD IN RCRD: CPT | Mod: CPTII,S$GLB,, | Performed by: OBSTETRICS & GYNECOLOGY

## 2022-04-26 PROCEDURE — 99395 PR PREVENTIVE VISIT,EST,18-39: ICD-10-PCS | Mod: S$GLB,,, | Performed by: OBSTETRICS & GYNECOLOGY

## 2022-04-26 PROCEDURE — 99999 PR PBB SHADOW E&M-EST. PATIENT-LVL III: CPT | Mod: PBBFAC,,, | Performed by: OBSTETRICS & GYNECOLOGY

## 2022-04-26 PROCEDURE — 3074F SYST BP LT 130 MM HG: CPT | Mod: CPTII,S$GLB,, | Performed by: OBSTETRICS & GYNECOLOGY

## 2022-04-26 PROCEDURE — 3078F DIAST BP <80 MM HG: CPT | Mod: CPTII,S$GLB,, | Performed by: OBSTETRICS & GYNECOLOGY

## 2022-04-26 PROCEDURE — 99395 PREV VISIT EST AGE 18-39: CPT | Mod: S$GLB,,, | Performed by: OBSTETRICS & GYNECOLOGY

## 2022-04-26 NOTE — PROGRESS NOTES
Subjective:       Patient ID: Maggie Obregon is a 37 y.o. female.    Chief Complaint:  Well Woman      History of Present Illness  HPI  Annual Exam-Premenopausal  Patient presents for annual exam. The patient has no complaints today. The patient is sexually active. GYN screening history: last pap: approximate date  and was normal and pt had TLH  for benign indications. The patient wears seatbelts: yes. The patient participates in regular exercise: no. Has the patient ever been transfused or tattooed?: yes. The patient reports that there is not domestic violence in her life.    Pt very happy with hysterectomy.  Pt denies any bowel or bladder or sexual problems since surgery.    GYN & OB History  Patient's last menstrual period was 10/07/2019.   Date of Last Pap: 2021    OB History    Para Term  AB Living   4 4 4     4   SAB IAB Ectopic Multiple Live Births           4      # Outcome Date GA Lbr Pastor/2nd Weight Sex Delivery Anes PTL Lv   4 Term 13 40w0d   M Vag-Spont   DORIS   3 Term 06 40w0d   F Vag-Spont   DORIS   2 Term 04 39w0d   F Vag-Spont   DORIS   1 Term 03 38w0d   F Vag-Spont   DORIS       Review of Systems  Review of Systems   Constitutional: Negative.    Respiratory: Negative.    Cardiovascular: Negative.    Gastrointestinal: Negative.    Endocrine: Negative.    Genitourinary: Negative.    Musculoskeletal: Negative.    Neurological: Negative.    Hematological: Negative.    Psychiatric/Behavioral: Negative.    Breast: negative.            Objective:    Physical Exam:   Constitutional: She is oriented to person, place, and time. She appears well-developed and well-nourished. No distress.    HENT:   Head: Normocephalic and atraumatic.       Pulmonary/Chest: Effort normal. No respiratory distress. Right breast exhibits no inverted nipple, no mass, no nipple discharge, no skin change, no tenderness, presence, no bleeding, no swelling, no mastectomy, no augmentation and no  lumpectomy. Left breast exhibits no inverted nipple, no mass, no nipple discharge, no skin change, no tenderness, presence, no bleeding, no swelling, no mastectomy, no augmentation and no lumpectomy.        Abdominal: Soft. She exhibits no distension and no mass. There is no abdominal tenderness. There is no rebound and no guarding.     Genitourinary:    Vagina, uterus, right adnexa and left adnexa normal.      Pelvic exam was performed with patient supine.   The external female genitalia was normal.   No external genitalia lesions identified,Genitalia hair distrobution normal .   Labial bartholins normal.There is no rash, tenderness, lesion or injury on the right labia. There is no rash, tenderness, lesion or injury on the left labia. Cervix is normal. No no adexnal prolapse, no nodularity or no masses or organomegaly. Right adnexum displays no mass, no tenderness and no fullness. Left adnexum displays no mass, no tenderness and no fullness. Vagina exhibits no lesion. No erythema,  no vaginal discharge, tenderness, bleeding, rectocele, cystocele or unspecified prolapse of vaginal walls in the vagina.    No foreign body in the vagina.      No signs of injury in the vagina.   Vagina was moist.Cervix exhibits no motion tenderness, no lesion, no discharge, no friability, no lesion, no tenderness and no polyp. Uterus consistancy normal. and Uerus contour normal  Uterus is not deviated, not enlarged, not fixed, not tender, not hosting fibroids and no uterine prolapse. Normal urethral meatus.Urethral Meatus exhibits: urethral lesion and prolapsedUrethra findings: no urethral mass, no tenderness and no urethral scarringBladder findings: no bladder distention and no bladder tenderness          Musculoskeletal: Normal range of motion and moves all extremeties. No tenderness.       Neurological: She is alert and oriented to person, place, and time. No cranial nerve deficit. Coordination normal.    Skin: She is not  diaphoretic.    Psychiatric: She has a normal mood and affect. Her behavior is normal. Judgment and thought content normal.          Assessment:        1. S/P total hysterectomy    2. Gynecologic exam normal    3. Tobacco abuse             Plan:      1.  Routine gyn exam  2.  Stressed smoking cessation

## 2023-08-10 ENCOUNTER — PATIENT MESSAGE (OUTPATIENT)
Dept: FAMILY MEDICINE | Facility: CLINIC | Age: 38
End: 2023-08-10
Payer: COMMERCIAL

## 2023-08-23 ENCOUNTER — PATIENT MESSAGE (OUTPATIENT)
Dept: OBSTETRICS AND GYNECOLOGY | Facility: CLINIC | Age: 38
End: 2023-08-23
Payer: COMMERCIAL

## 2023-08-23 ENCOUNTER — TELEPHONE (OUTPATIENT)
Dept: OBSTETRICS AND GYNECOLOGY | Facility: CLINIC | Age: 38
End: 2023-08-23
Payer: COMMERCIAL

## 2023-08-23 NOTE — TELEPHONE ENCOUNTER
Called the pt to offer an appt for [facility] since appt on 08/28 has been cancelled due to Dr. Choudhury having a schedule change. No answer. Unable to LVM because VM is full.

## 2025-02-04 ENCOUNTER — TELEPHONE (OUTPATIENT)
Dept: FAMILY MEDICINE | Facility: CLINIC | Age: 40
End: 2025-02-04
Payer: COMMERCIAL

## 2025-02-06 ENCOUNTER — HOSPITAL ENCOUNTER (OUTPATIENT)
Dept: RADIOLOGY | Facility: HOSPITAL | Age: 40
Discharge: HOME | End: 2025-02-06
Attending: FAMILY MEDICINE
Payer: COMMERCIAL

## 2025-02-06 DIAGNOSIS — Z12.31 ENCOUNTER FOR SCREENING MAMMOGRAM FOR BREAST CANCER: ICD-10-CM

## 2025-02-06 PROCEDURE — 77067 SCR MAMMO BI INCL CAD: CPT | Mod: 26,,, | Performed by: RADIOLOGY

## 2025-02-06 PROCEDURE — 77067 SCR MAMMO BI INCL CAD: CPT | Mod: TC

## 2025-02-06 PROCEDURE — 77063 BREAST TOMOSYNTHESIS BI: CPT | Mod: 26,,, | Performed by: RADIOLOGY

## 2025-02-10 ENCOUNTER — OFFICE VISIT (OUTPATIENT)
Dept: OBSTETRICS AND GYNECOLOGY | Facility: CLINIC | Age: 40
End: 2025-02-10
Payer: COMMERCIAL

## 2025-02-10 VITALS — BODY MASS INDEX: 37.96 KG/M2 | WEIGHT: 221.13 LBS

## 2025-02-10 DIAGNOSIS — R21 RASH: ICD-10-CM

## 2025-02-10 DIAGNOSIS — N89.8 VAGINAL DISCHARGE: ICD-10-CM

## 2025-02-10 DIAGNOSIS — Z01.419 ROUTINE GYNECOLOGICAL EXAMINATION: Primary | ICD-10-CM

## 2025-02-10 DIAGNOSIS — M79.629 PAIN IN AXILLA, UNSPECIFIED LATERALITY: ICD-10-CM

## 2025-02-10 DIAGNOSIS — Z90.710 S/P TOTAL HYSTERECTOMY: ICD-10-CM

## 2025-02-10 PROCEDURE — 1159F MED LIST DOCD IN RCRD: CPT | Mod: CPTII,S$GLB,, | Performed by: OBSTETRICS & GYNECOLOGY

## 2025-02-10 PROCEDURE — 81515 NFCT DS BV&VAGINITIS DNA ALG: CPT | Performed by: OBSTETRICS & GYNECOLOGY

## 2025-02-10 PROCEDURE — 3008F BODY MASS INDEX DOCD: CPT | Mod: CPTII,S$GLB,, | Performed by: OBSTETRICS & GYNECOLOGY

## 2025-02-10 PROCEDURE — 99999 PR PBB SHADOW E&M-EST. PATIENT-LVL II: CPT | Mod: PBBFAC,,, | Performed by: OBSTETRICS & GYNECOLOGY

## 2025-02-10 PROCEDURE — 99396 PREV VISIT EST AGE 40-64: CPT | Mod: S$GLB,,, | Performed by: OBSTETRICS & GYNECOLOGY

## 2025-02-10 RX ORDER — CLOTRIMAZOLE AND BETAMETHASONE DIPROPIONATE 10; .64 MG/G; MG/G
CREAM TOPICAL 2 TIMES DAILY
Qty: 45 G | Refills: 0 | Status: SHIPPED | OUTPATIENT
Start: 2025-02-10

## 2025-02-10 NOTE — PROGRESS NOTES
Subjective     Patient ID: Maggie Obregon is a 39 y.o. female.    Chief Complaint:  No chief complaint on file.      History of Present Illness  HPI  Annual Exam-Premenopausal  Patient presents for annual exam. The patient has no complaints today. The patient is sexually active. GYN screening history: last pap: was normal and pt had TLH on 2021 for benign indications . The patient wears seatbelts: yes. The patient participates in regular exercise: no. Has the patient ever been transfused or tattooed?: yes. The patient reports that there is not domestic violence in her life.    Pt admits a vaginal discharge, no itching or odor.    Pt also admits skin rash in axilla bilaterally.    GYN & OB History  Patient's last menstrual period was 10/07/2019.   Date of Last Pap: 2021    OB History    Para Term  AB Living   4 4 4     4   SAB IAB Ectopic Multiple Live Births           4      # Outcome Date GA Lbr Pastor/2nd Weight Sex Type Anes PTL Lv   4 Term 13 40w0d   M Vag-Spont   DORIS   3 Term 06 40w0d   F Vag-Spont   DORIS   2 Term 04 39w0d   F Vag-Spont   DORIS   1 Term 03 38w0d   F Vag-Spont   DORIS       Review of Systems  Review of Systems   Constitutional: Negative.    Respiratory: Negative.     Cardiovascular: Negative.    Gastrointestinal: Negative.    Endocrine: Negative.    Genitourinary: Negative.    Musculoskeletal: Negative.    Neurological: Negative.    Psychiatric/Behavioral: Negative.     Breast: negative.         Objective   Physical Exam:   Constitutional: She is oriented to person, place, and time. She appears well-developed and well-nourished. No distress.    HENT:   Head: Normocephalic and atraumatic.     Neck: No thyromegaly present.     Pulmonary/Chest: Effort normal. No respiratory distress. Right breast exhibits no inverted nipple, no mass, no nipple discharge, no skin change, no tenderness, presence, no bleeding, no swelling, no mastectomy, no augmentation and no  lumpectomy. Left breast exhibits no inverted nipple, no mass, no nipple discharge, no skin change, no tenderness, presence, no bleeding, no swelling, no mastectomy, no augmentation and no lumpectomy. Breasts are symmetrical.        Abdominal: Soft. She exhibits no distension and no mass. There is no abdominal tenderness. There is no rebound and no guarding.     Genitourinary:    Urethra, bladder, vagina, right adnexa and left adnexa normal.      Pelvic exam was performed with patient in the lithotomy position.   The external female genitalia was normal.   No external genitalia lesions identified,Genitalia hair distrobution normal .     Labial bartholins normal.There is no rash, tenderness, lesion or injury on the right labia. There is no rash, tenderness, lesion or injury on the left labia. No no adexnal prolapse. Right adnexum displays no mass, no tenderness and no fullness. Left adnexum displays no mass, no tenderness and no fullness. Vagina exhibits no lesion. No erythema, vaginal discharge, tenderness, bleeding, rectocele, cystocele or prolapse of vaginal walls in the vagina.    No foreign body in the vagina.      No signs of injury in the vagina.   Vagina was moist.Cervix is absent.Uterus is absent. Normal urethral meatus.Urethral Meatus exhibits: no urethral lesionUrethra findings: no urethral mass, no tenderness, no urethral scarring and no prolapsedBladder findings: no bladder distention and no bladder tenderness   Genitourinary Comments: Female chaperone, Tigist Morillo,  present for entire exam               Musculoskeletal: Normal range of motion and moves all extremeties. No tenderness.       Neurological: She is alert and oriented to person, place, and time. No cranial nerve deficit. Coordination normal.    Skin: Skin is warm. She is not diaphoretic. No erythema.    Psychiatric: She has a normal mood and affect. Her behavior is normal. Judgment and thought content normal.          Assessment and  Plan     1. Routine gynecological examination    2. S/P total hysterectomy    3. Vaginal discharge    4. Pain in axilla, unspecified laterality    5. Rash          Plan:   Pap smears no longer indicated  AFFIRM collected  Pt up to date with [facility]  Rx lotrisone cream to axillary rash

## 2025-02-12 ENCOUNTER — TELEPHONE (OUTPATIENT)
Dept: OBSTETRICS AND GYNECOLOGY | Facility: HOSPITAL | Age: 40
End: 2025-02-12
Payer: COMMERCIAL

## 2025-02-12 ENCOUNTER — TELEPHONE (OUTPATIENT)
Dept: FAMILY MEDICINE | Facility: CLINIC | Age: 40
End: 2025-02-12
Payer: COMMERCIAL

## 2025-02-12 DIAGNOSIS — N76.0 BACTERIAL VAGINOSIS: Primary | ICD-10-CM

## 2025-02-12 DIAGNOSIS — B96.89 BACTERIAL VAGINOSIS: Primary | ICD-10-CM

## 2025-02-12 LAB
BACTERIAL VAGINOSIS DNA: DETECTED
CANDIDA GLABRATA/KRUSEI: NOT DETECTED
CANDIDA RRNA VAG QL PROBE: NOT DETECTED
TRICHOMONAS VAGINALIS: NOT DETECTED

## 2025-02-12 RX ORDER — METRONIDAZOLE 500 MG/1
500 TABLET ORAL EVERY 12 HOURS
Qty: 14 TABLET | Refills: 0 | Status: SHIPPED | OUTPATIENT
Start: 2025-02-12 | End: 2025-02-19

## 2025-02-12 NOTE — TELEPHONE ENCOUNTER
Spoke with pt to get her rescheduled with Dr. Torres as a new patient. Pt declined since insurance will change in March 2025. Found a provider sometime in February pt declined sooner appt.

## (undated) DEVICE — IRRIGATOR ENDOSCOPY DISP.

## (undated) DEVICE — APPLICATOR CHLORAPREP ORN 26ML

## (undated) DEVICE — SEE MEDLINE ITEM 152622

## (undated) DEVICE — SOL CLEARIFY VISUALIZATION LAP

## (undated) DEVICE — SUT VLOC 180 ABSORB ESTITCH

## (undated) DEVICE — PAD SANITARY OB STERILE

## (undated) DEVICE — GLOVE SURG BIOGEL LATEX SZ 7.5

## (undated) DEVICE — SYR 10CC LUER LOCK

## (undated) DEVICE — TROCAR ENDOPATH XCEL 11MM 10CM

## (undated) DEVICE — BLADE SURG CARBON STEEL SZ11

## (undated) DEVICE — UNDERGLOVES BIOGEL PI SIZE 7.5

## (undated) DEVICE — PACK LAPAROSCOPY/PELVISCOPY II

## (undated) DEVICE — OCCLUDER COLPO-PNEUMO STERILE

## (undated) DEVICE — SEE MEDLINE ITEM 157181

## (undated) DEVICE — Device

## (undated) DEVICE — SEE MEDLINE ITEM 157110

## (undated) DEVICE — COVER OVERHEAD SURG LT BLUE

## (undated) DEVICE — KIT ANTIFOG

## (undated) DEVICE — UNDERGLOVES BIOGEL PI SZ 7 LF

## (undated) DEVICE — SUT RAPIDE 4-0

## (undated) DEVICE — TRAY FOLEY 16FR INFECTION CONT

## (undated) DEVICE — SEE MEDLINE ITEM 146292

## (undated) DEVICE — NDL INSUF ULTRA VERESS 120MM

## (undated) DEVICE — HOOK DISSECTING 5MM

## (undated) DEVICE — ENDOSTITCH INSTRUMENT

## (undated) DEVICE — TIP RUMI WHITE DISP UMW676

## (undated) DEVICE — CANISTER SUCTION 2 LTR

## (undated) DEVICE — UNDERGLOVES BIOGEL PI SZ 6 LF

## (undated) DEVICE — DEVICE N-SEAL LAPROSCOPIC

## (undated) DEVICE — DRAPE STERI LONG

## (undated) DEVICE — SEE MEDLINE ITEM 152487

## (undated) DEVICE — PAD PREP 50/CA

## (undated) DEVICE — BLANKET UPPER BODY 78.7X29.9IN

## (undated) DEVICE — GOWN XX-LARGE

## (undated) DEVICE — SOL 9P NACL IRR PIC IL

## (undated) DEVICE — SUT VICRYL PLUS 0 CT1 36IN

## (undated) DEVICE — SEE MEDLINE ITEM 154981

## (undated) DEVICE — TUBING INSUFFLATION 10

## (undated) DEVICE — TROCAR ENDOPATH XCEL 5X100MM

## (undated) DEVICE — SOL NS 1000CC

## (undated) DEVICE — SYR 50CC LL

## (undated) DEVICE — SEE MEDLINE ITEM 157150

## (undated) DEVICE — GLOVE BIOGEL PI MICRO SZ 6

## (undated) DEVICE — MAT QUICK 40X30 FLOOR FLUID LF

## (undated) DEVICE — ELECTRODE REM PLYHSV RETURN 9

## (undated) DEVICE — DRESSING ADH ISLAND 2.5 X 3